# Patient Record
Sex: FEMALE | Race: WHITE | Employment: FULL TIME | URBAN - METROPOLITAN AREA
[De-identification: names, ages, dates, MRNs, and addresses within clinical notes are randomized per-mention and may not be internally consistent; named-entity substitution may affect disease eponyms.]

---

## 2017-04-14 PROBLEM — K21.9 GERD WITHOUT ESOPHAGITIS: Status: ACTIVE | Noted: 2017-04-14

## 2017-04-14 PROCEDURE — 88175 CYTOPATH C/V AUTO FLUID REDO: CPT | Performed by: FAMILY MEDICINE

## 2018-08-22 PROBLEM — F41.1 GAD (GENERALIZED ANXIETY DISORDER): Status: ACTIVE | Noted: 2018-08-22

## 2018-08-22 PROBLEM — J30.1 ALLERGIC RHINITIS DUE TO POLLEN, UNSPECIFIED SEASONALITY: Status: ACTIVE | Noted: 2018-08-22

## 2023-05-16 ENCOUNTER — TELEPHONE (OUTPATIENT)
Dept: OBGYN CLINIC | Facility: CLINIC | Age: 33
End: 2023-05-16

## 2023-05-16 ENCOUNTER — LAB ENCOUNTER (OUTPATIENT)
Dept: LAB | Facility: HOSPITAL | Age: 33
End: 2023-05-16
Attending: ADVANCED PRACTICE MIDWIFE
Payer: COMMERCIAL

## 2023-05-16 ENCOUNTER — OFFICE VISIT (OUTPATIENT)
Dept: OBGYN CLINIC | Facility: CLINIC | Age: 33
End: 2023-05-16

## 2023-05-16 VITALS
DIASTOLIC BLOOD PRESSURE: 76 MMHG | HEIGHT: 62 IN | BODY MASS INDEX: 24.48 KG/M2 | SYSTOLIC BLOOD PRESSURE: 122 MMHG | HEART RATE: 80 BPM | WEIGHT: 133 LBS

## 2023-05-16 DIAGNOSIS — O20.9 VAGINAL BLEEDING AFFECTING EARLY PREGNANCY: Primary | ICD-10-CM

## 2023-05-16 DIAGNOSIS — O20.9 BLEEDING IN EARLY PREGNANCY: ICD-10-CM

## 2023-05-16 DIAGNOSIS — Z11.3 SCREEN FOR STD (SEXUALLY TRANSMITTED DISEASE): ICD-10-CM

## 2023-05-16 DIAGNOSIS — O20.9 BLEEDING IN EARLY PREGNANCY: Primary | ICD-10-CM

## 2023-05-16 DIAGNOSIS — N92.6 MISSED MENSES: ICD-10-CM

## 2023-05-16 LAB
ANTIBODY SCREEN: NEGATIVE
B-HCG SERPL-ACNC: 211 MIU/ML
CONTROL LINE PRESENT WITH A CLEAR BACKGROUND (YES/NO): YES YES/NO
PREGNANCY TEST, URINE: POSITIVE
PROGEST SERPL-MCNC: 2.28 NG/ML
RH BLOOD TYPE: POSITIVE

## 2023-05-16 PROCEDURE — 36415 COLL VENOUS BLD VENIPUNCTURE: CPT

## 2023-05-16 PROCEDURE — 3008F BODY MASS INDEX DOCD: CPT | Performed by: ADVANCED PRACTICE MIDWIFE

## 2023-05-16 PROCEDURE — 86850 RBC ANTIBODY SCREEN: CPT

## 2023-05-16 PROCEDURE — 99203 OFFICE O/P NEW LOW 30 MIN: CPT | Performed by: ADVANCED PRACTICE MIDWIFE

## 2023-05-16 PROCEDURE — 3078F DIAST BP <80 MM HG: CPT | Performed by: ADVANCED PRACTICE MIDWIFE

## 2023-05-16 PROCEDURE — 84144 ASSAY OF PROGESTERONE: CPT

## 2023-05-16 PROCEDURE — 81025 URINE PREGNANCY TEST: CPT | Performed by: ADVANCED PRACTICE MIDWIFE

## 2023-05-16 PROCEDURE — 86900 BLOOD TYPING SEROLOGIC ABO: CPT

## 2023-05-16 PROCEDURE — 3074F SYST BP LT 130 MM HG: CPT | Performed by: ADVANCED PRACTICE MIDWIFE

## 2023-05-16 PROCEDURE — 84702 CHORIONIC GONADOTROPIN TEST: CPT

## 2023-05-16 PROCEDURE — 86901 BLOOD TYPING SEROLOGIC RH(D): CPT

## 2023-05-16 RX ORDER — ESCITALOPRAM OXALATE 20 MG/1
10 TABLET ORAL DAILY
Qty: 15 TABLET | Refills: 5 | COMMUNITY
Start: 2023-04-17 | End: 2023-10-14

## 2023-05-16 RX ORDER — CHOLECALCIFEROL (VITAMIN D3) 25 MCG
1 TABLET,CHEWABLE ORAL DAILY
COMMUNITY

## 2023-05-17 LAB
C TRACH DNA SPEC QL NAA+PROBE: NEGATIVE
N GONORRHOEA DNA SPEC QL NAA+PROBE: NEGATIVE

## 2023-05-18 ENCOUNTER — LAB ENCOUNTER (OUTPATIENT)
Dept: LAB | Facility: HOSPITAL | Age: 33
End: 2023-05-18
Attending: ADVANCED PRACTICE MIDWIFE
Payer: COMMERCIAL

## 2023-05-18 DIAGNOSIS — O20.9 VAGINAL BLEEDING AFFECTING EARLY PREGNANCY: ICD-10-CM

## 2023-05-18 LAB
B-HCG SERPL-ACNC: 55 MIU/ML
GENITAL VAGINOSIS SCREEN: NEGATIVE
PROGEST SERPL-MCNC: 1.11 NG/ML
TRICHOMONAS SCREEN: NEGATIVE

## 2023-05-18 PROCEDURE — 84702 CHORIONIC GONADOTROPIN TEST: CPT

## 2023-05-18 PROCEDURE — 84144 ASSAY OF PROGESTERONE: CPT | Performed by: ADVANCED PRACTICE MIDWIFE

## 2023-05-18 PROCEDURE — 36415 COLL VENOUS BLD VENIPUNCTURE: CPT

## 2023-05-25 ENCOUNTER — TELEPHONE (OUTPATIENT)
Dept: OBGYN CLINIC | Facility: CLINIC | Age: 33
End: 2023-05-25

## 2023-05-25 DIAGNOSIS — O03.9 MISCARRIAGE: Primary | ICD-10-CM

## 2023-05-25 NOTE — TELEPHONE ENCOUNTER
Patient name and  verified. Patient informed of CNM result note and recommendations. Lab locations and hours sent to patient for weekly lab completion. Verbalized understanding.

## 2023-05-25 NOTE — TELEPHONE ENCOUNTER
----- Message from Trevor Hubbard CNM sent at 5/24/2023  3:35 PM CDT -----  Pt should repeat this week to follow to zero  Should also schedule a follow up appt

## 2023-05-30 ENCOUNTER — LAB ENCOUNTER (OUTPATIENT)
Dept: LAB | Age: 33
End: 2023-05-30
Attending: ADVANCED PRACTICE MIDWIFE
Payer: COMMERCIAL

## 2023-05-30 DIAGNOSIS — O03.9 MISCARRIAGE: ICD-10-CM

## 2023-05-30 LAB — B-HCG SERPL-ACNC: 2 MIU/ML

## 2023-05-30 PROCEDURE — 84702 CHORIONIC GONADOTROPIN TEST: CPT

## 2023-05-30 PROCEDURE — 36415 COLL VENOUS BLD VENIPUNCTURE: CPT

## 2023-06-01 ENCOUNTER — TELEPHONE (OUTPATIENT)
Dept: OBGYN CLINIC | Facility: CLINIC | Age: 33
End: 2023-06-01

## 2023-06-01 NOTE — TELEPHONE ENCOUNTER
----- Message from Raoul Monzon CNM sent at 5/31/2023  9:22 AM CDT -----  HCG back to normal  Pt should return to menses in the next couple of months  If desires can attempt pregnancy  If does bnot desire pregnancy at this time should have follow up visit for contraception Folic acid daily

## 2023-09-19 ENCOUNTER — OFFICE VISIT (OUTPATIENT)
Dept: OBGYN CLINIC | Facility: CLINIC | Age: 33
End: 2023-09-19

## 2023-09-19 ENCOUNTER — LAB ENCOUNTER (OUTPATIENT)
Dept: LAB | Facility: REFERENCE LAB | Age: 33
End: 2023-09-19
Attending: OBSTETRICS & GYNECOLOGY
Payer: COMMERCIAL

## 2023-09-19 VITALS
SYSTOLIC BLOOD PRESSURE: 144 MMHG | HEART RATE: 76 BPM | WEIGHT: 132 LBS | BODY MASS INDEX: 24 KG/M2 | DIASTOLIC BLOOD PRESSURE: 87 MMHG

## 2023-09-19 DIAGNOSIS — N92.6 IRREGULAR PERIODS: ICD-10-CM

## 2023-09-19 DIAGNOSIS — N92.6 IRREGULAR PERIODS: Primary | ICD-10-CM

## 2023-09-19 LAB
T4 FREE SERPL-MCNC: 0.7 NG/DL (ref 0.8–1.7)
TSI SER-ACNC: 4.41 MIU/ML (ref 0.36–3.74)

## 2023-09-19 PROCEDURE — 84439 ASSAY OF FREE THYROXINE: CPT

## 2023-09-19 PROCEDURE — 84443 ASSAY THYROID STIM HORMONE: CPT

## 2023-09-19 PROCEDURE — 3079F DIAST BP 80-89 MM HG: CPT | Performed by: OBSTETRICS & GYNECOLOGY

## 2023-09-19 PROCEDURE — 99213 OFFICE O/P EST LOW 20 MIN: CPT | Performed by: OBSTETRICS & GYNECOLOGY

## 2023-09-19 PROCEDURE — 36415 COLL VENOUS BLD VENIPUNCTURE: CPT

## 2023-09-19 PROCEDURE — 3077F SYST BP >= 140 MM HG: CPT | Performed by: OBSTETRICS & GYNECOLOGY

## 2023-09-19 RX ORDER — ESCITALOPRAM OXALATE 10 MG/1
10 TABLET ORAL DAILY
COMMUNITY

## 2023-09-20 ENCOUNTER — TELEPHONE (OUTPATIENT)
Dept: OBGYN CLINIC | Facility: CLINIC | Age: 33
End: 2023-09-20

## 2023-09-20 NOTE — TELEPHONE ENCOUNTER
Pt was told to schedule a video visit f/u for test results, next opening for a virtual visit with JHONNY was 10/5, pt states that's too long of a wait.  Please advise

## 2023-09-20 NOTE — TELEPHONE ENCOUNTER
Lupe Clifton MD  9/20/2023  9:26 AM CDT Back to Top      Please schedule patient for a video visit tomorrow, 9/20/23 at 3:00, to discuss her results. Result noted     Scheduled tomorrow at 3 pm for video visit. Aware of scheduling info.

## 2023-09-21 ENCOUNTER — TELEMEDICINE (OUTPATIENT)
Dept: OBGYN CLINIC | Facility: CLINIC | Age: 33
End: 2023-09-21

## 2023-09-21 DIAGNOSIS — N92.6 IRREGULAR PERIODS: Primary | ICD-10-CM

## 2023-09-21 DIAGNOSIS — E03.9 HYPOTHYROIDISM, UNSPECIFIED TYPE: ICD-10-CM

## 2023-09-21 PROCEDURE — 99213 OFFICE O/P EST LOW 20 MIN: CPT | Performed by: OBSTETRICS & GYNECOLOGY

## 2023-09-24 RX ORDER — LEVOTHYROXINE SODIUM 0.03 MG/1
25 TABLET ORAL
Qty: 30 TABLET | Refills: 3 | Status: SHIPPED | OUTPATIENT
Start: 2023-09-24

## 2023-09-25 NOTE — PROGRESS NOTES
[facility] (formerly [facility]  Obstetrics and Gynecology  Video Visit  Focused Gynecology Problem Exam  Arielle Walker MD    Mundo  is a 34year old female presenting for follow up of labs. HPI:   Pt with elevated TSH and low T3. Medications (Active prior to today's visit):  Current Outpatient Medications   Medication Sig Dispense Refill    levothyroxine 25 MCG Oral Tab Take 1 tablet (25 mcg total) by mouth before breakfast. 30 tablet 3    escitalopram 10 MG Oral Tab Take 1 tablet (10 mg total) by mouth daily. escitalopram 20 MG Oral Tab Take 0.5 tablets (10 mg total) by mouth daily. 15 tablet 5    prenatal vitamin with DHA 27-0.8-228 MG Oral Cap Take 1 capsule by mouth daily. sertraline 100 MG Oral Tab Take 1 tablet (100 mg total) by mouth daily. 30 tablet 1     Allergies:    Dextromethorphan-Gu*    DIZZINESS  HISTORY:     OB History    Para Term  AB Living   1       1     SAB IAB Ectopic Multiple Live Births   1              # Outcome Date GA Lbr Gabriel/2nd Weight Sex Delivery Anes PTL Lv   1 SAB 2015     SAB   FD                                        Past Medical History:   Diagnosis Date    Esophageal reflux        No past surgical history on file.     Family History   Problem Relation Age of Onset    Hypertension Father     Lipids Father     High Cholesterol Father     Hypertension Mother     High Cholesterol Mother     Other (Gilbert's disease) Mother     Other (Gilbert's disease) Brother        Social History    Socioeconomic History      Marital status:       Spouse name: Not on file      Number of children: Not on file      Years of education: Not on file      Highest education level: Not on file    Occupational History      Not on file    Tobacco Use      Smoking status: Never      Smokeless tobacco: Never    Vaping Use      Vaping Use: Never used    Substance and Sexual Activity      Alcohol use: Yes        Comment: socially      Drug use: Never      Sexual activity: Not on file    Other Topics      Concerns:        Not on file    Social History Narrative      engaged, no kids, works in [address] Strain: Not on file  Food Insecurity: Not on file  Transportation Needs: Not on file  Physical Activity: Not on file  Stress: Not on file  Social Connections: Not on file  Housing Stability: Not on file    PHYSICAL EXAM:   Video Visit    GENERAL: well developed, well nourished, in no apparent distress     ASSESSMENT:    A: 35 yo  with hypothyroidism based on lab results.    (N92.6) Irregular periods  (primary encounter diagnosis)    (E03.9) Hypothyroidism, unspecified type  Plan: TSH W Reflex To Free T4      PLAN:   I reviewed lab results with patient. I discussed hypothyroidism. Pt stated she had previously been treated for hypothyroidism/thyroid disease. Levothyroxine started and pt to have repeat labs 4 weeks after starting medication.     ORDERS:   Orders Placed This Encounter      TSH W Reflex To Free T4    PRESCRIPTIONS:     Requested Prescriptions     Signed Prescriptions Disp Refills    levothyroxine 25 MCG Oral Tab 30 tablet 3     Sig: Take 1 tablet (25 mcg total) by mouth before breakfast.     IMAGING/ REFERRALS:    None     Nieves Parnell MD, MD  2023  11:05 PM

## 2023-10-06 ENCOUNTER — TELEPHONE (OUTPATIENT)
Dept: OBGYN CLINIC | Facility: CLINIC | Age: 33
End: 2023-10-06

## 2023-10-06 NOTE — TELEPHONE ENCOUNTER
Patient verified name and     Clarified with patient, discussed ultrasound is typically not included with MM appointment and is usually ordered with Radiology. Patient verbalized understanding and agreed.

## 2023-10-26 ENCOUNTER — OFFICE VISIT (OUTPATIENT)
Dept: OBGYN CLINIC | Facility: CLINIC | Age: 33
End: 2023-10-26

## 2023-10-26 VITALS
HEART RATE: 93 BPM | HEIGHT: 62 IN | SYSTOLIC BLOOD PRESSURE: 134 MMHG | BODY MASS INDEX: 24.29 KG/M2 | WEIGHT: 132 LBS | DIASTOLIC BLOOD PRESSURE: 78 MMHG

## 2023-10-26 DIAGNOSIS — Z32.01 PREGNANCY EXAMINATION OR TEST, POSITIVE RESULT: Primary | ICD-10-CM

## 2023-10-26 DIAGNOSIS — O36.80X0 ULTRASOUND SCAN TO CONFIRM FETAL VIABILITY WITH HISTORY OF MISCARRIAGE: ICD-10-CM

## 2023-10-26 DIAGNOSIS — Z87.59 ULTRASOUND SCAN TO CONFIRM FETAL VIABILITY WITH HISTORY OF MISCARRIAGE: ICD-10-CM

## 2023-10-26 DIAGNOSIS — N92.6 MISSED MENSES: ICD-10-CM

## 2023-10-26 LAB
CONTROL LINE PRESENT WITH A CLEAR BACKGROUND (YES/NO): YES YES/NO
KIT LOT #: NORMAL NUMERIC
PREGNANCY TEST, URINE: POSITIVE

## 2023-10-26 PROCEDURE — 3008F BODY MASS INDEX DOCD: CPT | Performed by: OBSTETRICS & GYNECOLOGY

## 2023-10-26 PROCEDURE — 76817 TRANSVAGINAL US OBSTETRIC: CPT | Performed by: OBSTETRICS & GYNECOLOGY

## 2023-10-26 PROCEDURE — 81025 URINE PREGNANCY TEST: CPT | Performed by: OBSTETRICS & GYNECOLOGY

## 2023-10-26 PROCEDURE — 3075F SYST BP GE 130 - 139MM HG: CPT | Performed by: OBSTETRICS & GYNECOLOGY

## 2023-10-26 PROCEDURE — 3078F DIAST BP <80 MM HG: CPT | Performed by: OBSTETRICS & GYNECOLOGY

## 2023-10-26 RX ORDER — TRAZODONE HYDROCHLORIDE 50 MG/1
TABLET ORAL
COMMUNITY
Start: 2023-08-15 | End: 2023-10-26

## 2023-11-13 ENCOUNTER — TELEPHONE (OUTPATIENT)
Dept: OBGYN CLINIC | Facility: CLINIC | Age: 33
End: 2023-11-13

## 2023-11-13 NOTE — TELEPHONE ENCOUNTER
Patient verified name and     Pt r/s Nurse Ed visit. Aware someone will call her this next appt. Patient verbalized understanding and agreed.

## 2023-11-13 NOTE — TELEPHONE ENCOUNTER
Pt never received call from [facility] for Eduation visit today 11/13. Pt just wants to make sure her next appt she will get a call.     Pls advise

## 2023-11-20 ENCOUNTER — NURSE ONLY (OUTPATIENT)
Dept: OBGYN CLINIC | Facility: CLINIC | Age: 33
End: 2023-11-20
Payer: COMMERCIAL

## 2023-11-20 DIAGNOSIS — Z34.91 ENCOUNTER FOR SUPERVISION OF NORMAL PREGNANCY IN FIRST TRIMESTER, UNSPECIFIED GRAVIDITY: Primary | ICD-10-CM

## 2023-11-20 NOTE — PROGRESS NOTES
Pt called today for RN [facility] Education. Missed menses apt with: JHONNY  LMP: 23  +UPT at home: 23  +UPT in office: 10/26/23    Pre  BMI: 23.41  ASA Therapy: initiated One 81 mg tablet per day    EPDS score: 0/30      US: 10/26  Working ADRIAN: 24  Hx of genetic abnormality in family: denies  Hx of varicella: had disease  Flu Vaccine: has not had this season     Consent (if needed): n/a    OUD Screening: Pt. Has answered NO 5P questions and has NO  risk factors. Pt. Given What pregnant women need to know handout. SDOH Screening: low risk      Educational material reviewed with patient: Prenatal care, nutrition, weight gain recommendations, travel, exercise, intercourse, pregnancy changes, safe medications, pregnancy and work, fetal movement, labor and  labor, warning signs, food safety, tdap, cord blood, breastfeeding, circumcision, and Group B strep. Plans to breast and bottle feed. Undecided regarding circ if male. Blood transfusion if needed: consents    PN labs: 1st trimester labs; TSH with reflex per JHONNY  Iron Supplementation: recommended Feosol or Slow FE every other day. Optional genetic screening labs were reviewed: James Pina, FTS with US, Quad screen MSAFP and CF screening. Pt declines at this time.     [facility] Media Policy: reviewed    NOB apt: 23 with JHONNY

## 2023-11-22 ENCOUNTER — LAB ENCOUNTER (OUTPATIENT)
Dept: LAB | Age: 33
End: 2023-11-22
Attending: OBSTETRICS & GYNECOLOGY
Payer: COMMERCIAL

## 2023-11-22 DIAGNOSIS — O03.9 MISCARRIAGE: ICD-10-CM

## 2023-11-22 DIAGNOSIS — Z34.91 ENCOUNTER FOR SUPERVISION OF NORMAL PREGNANCY IN FIRST TRIMESTER, UNSPECIFIED GRAVIDITY: ICD-10-CM

## 2023-11-22 DIAGNOSIS — E03.9 HYPOTHYROIDISM, UNSPECIFIED TYPE: ICD-10-CM

## 2023-11-22 LAB
ANTIBODY SCREEN: NEGATIVE
B-HCG SERPL-ACNC: ABNORMAL MIU/ML
BASOPHILS # BLD AUTO: 0.03 X10(3) UL (ref 0–0.2)
BASOPHILS NFR BLD AUTO: 0.3 %
BILIRUB UR QL: NEGATIVE
CLARITY UR: CLEAR
DEPRECATED HBV CORE AB SER IA-ACNC: 22.5 NG/ML
DEPRECATED RDW RBC AUTO: 39.8 FL (ref 35.1–46.3)
EOSINOPHIL # BLD AUTO: 0.07 X10(3) UL (ref 0–0.7)
EOSINOPHIL NFR BLD AUTO: 0.8 %
ERYTHROCYTE [DISTWIDTH] IN BLOOD BY AUTOMATED COUNT: 12.3 % (ref 11–15)
EST. AVERAGE GLUCOSE BLD GHB EST-MCNC: 97 MG/DL (ref 68–126)
GLUCOSE 1H P GLC SERPL-MCNC: 91 MG/DL
GLUCOSE UR-MCNC: NORMAL MG/DL
HBA1C MFR BLD: 5 % (ref ?–5.7)
HBV SURFACE AG SER-ACNC: <0.1 [IU]/L
HBV SURFACE AG SERPL QL IA: NONREACTIVE
HCT VFR BLD AUTO: 42.9 %
HCV AB SERPL QL IA: NONREACTIVE
HGB BLD-MCNC: 14.4 G/DL
HGB UR QL STRIP.AUTO: NEGATIVE
IMM GRANULOCYTES # BLD AUTO: 0.03 X10(3) UL (ref 0–1)
IMM GRANULOCYTES NFR BLD: 0.3 %
KETONES UR-MCNC: NEGATIVE MG/DL
LEUKOCYTE ESTERASE UR QL STRIP.AUTO: NEGATIVE
LYMPHOCYTES # BLD AUTO: 1.8 X10(3) UL (ref 1–4)
LYMPHOCYTES NFR BLD AUTO: 19.4 %
MCH RBC QN AUTO: 29.5 PG (ref 26–34)
MCHC RBC AUTO-ENTMCNC: 33.6 G/DL (ref 31–37)
MCV RBC AUTO: 87.9 FL
MONOCYTES # BLD AUTO: 0.41 X10(3) UL (ref 0.1–1)
MONOCYTES NFR BLD AUTO: 4.4 %
NEUTROPHILS # BLD AUTO: 6.95 X10 (3) UL (ref 1.5–7.7)
NEUTROPHILS # BLD AUTO: 6.95 X10(3) UL (ref 1.5–7.7)
NEUTROPHILS NFR BLD AUTO: 74.8 %
NITRITE UR QL STRIP.AUTO: NEGATIVE
PH UR: 6 [PH] (ref 5–8)
PLATELET # BLD AUTO: 235 10(3)UL (ref 150–450)
PROT UR-MCNC: NEGATIVE MG/DL
RBC # BLD AUTO: 4.88 X10(6)UL
RH BLOOD TYPE: POSITIVE
RUBV IGG SER QL: POSITIVE
RUBV IGG SER-ACNC: >500 IU/ML (ref 10–?)
SP GR UR STRIP: 1.02 (ref 1–1.03)
TSI SER-ACNC: 3.31 MIU/ML (ref 0.55–4.78)
UROBILINOGEN UR STRIP-ACNC: NORMAL
WBC # BLD AUTO: 9.3 X10(3) UL (ref 4–11)

## 2023-11-22 PROCEDURE — 36415 COLL VENOUS BLD VENIPUNCTURE: CPT

## 2023-11-22 PROCEDURE — 86780 TREPONEMA PALLIDUM: CPT

## 2023-11-22 PROCEDURE — 87389 HIV-1 AG W/HIV-1&-2 AB AG IA: CPT

## 2023-11-22 PROCEDURE — 85025 COMPLETE CBC W/AUTO DIFF WBC: CPT

## 2023-11-22 PROCEDURE — 87086 URINE CULTURE/COLONY COUNT: CPT

## 2023-11-22 PROCEDURE — 84702 CHORIONIC GONADOTROPIN TEST: CPT

## 2023-11-22 PROCEDURE — 81003 URINALYSIS AUTO W/O SCOPE: CPT

## 2023-11-22 PROCEDURE — 86850 RBC ANTIBODY SCREEN: CPT

## 2023-11-22 PROCEDURE — 86762 RUBELLA ANTIBODY: CPT

## 2023-11-22 PROCEDURE — 84443 ASSAY THYROID STIM HORMONE: CPT

## 2023-11-22 PROCEDURE — 86803 HEPATITIS C AB TEST: CPT

## 2023-11-22 PROCEDURE — 82728 ASSAY OF FERRITIN: CPT

## 2023-11-22 PROCEDURE — 86901 BLOOD TYPING SEROLOGIC RH(D): CPT

## 2023-11-22 PROCEDURE — 87340 HEPATITIS B SURFACE AG IA: CPT

## 2023-11-22 PROCEDURE — 82950 GLUCOSE TEST: CPT

## 2023-11-22 PROCEDURE — 86900 BLOOD TYPING SEROLOGIC ABO: CPT

## 2023-11-22 PROCEDURE — 83036 HEMOGLOBIN GLYCOSYLATED A1C: CPT

## 2023-11-23 PROBLEM — R79.0 LOW SERUM FERRITIN LEVEL: Status: ACTIVE | Noted: 2023-11-23

## 2023-11-24 LAB — T PALLIDUM AB SER QL: NEGATIVE

## 2023-11-27 ENCOUNTER — INITIAL PRENATAL (OUTPATIENT)
Dept: OBGYN CLINIC | Facility: CLINIC | Age: 33
End: 2023-11-27

## 2023-11-27 VITALS
HEART RATE: 87 BPM | BODY MASS INDEX: 24 KG/M2 | DIASTOLIC BLOOD PRESSURE: 82 MMHG | WEIGHT: 132 LBS | SYSTOLIC BLOOD PRESSURE: 133 MMHG

## 2023-11-27 DIAGNOSIS — Z34.82 ENCOUNTER FOR SUPERVISION OF OTHER NORMAL PREGNANCY IN SECOND TRIMESTER: Primary | ICD-10-CM

## 2023-11-27 DIAGNOSIS — E03.9 HYPOTHYROIDISM DURING PREGNANCY, ANTEPARTUM: ICD-10-CM

## 2023-11-27 DIAGNOSIS — O99.280 HYPOTHYROIDISM DURING PREGNANCY, ANTEPARTUM: ICD-10-CM

## 2023-11-27 PROCEDURE — 3075F SYST BP GE 130 - 139MM HG: CPT | Performed by: OBSTETRICS & GYNECOLOGY

## 2023-11-27 PROCEDURE — 90686 IIV4 VACC NO PRSV 0.5 ML IM: CPT | Performed by: OBSTETRICS & GYNECOLOGY

## 2023-11-27 PROCEDURE — 90471 IMMUNIZATION ADMIN: CPT | Performed by: OBSTETRICS & GYNECOLOGY

## 2023-11-27 PROCEDURE — 99213 OFFICE O/P EST LOW 20 MIN: CPT | Performed by: OBSTETRICS & GYNECOLOGY

## 2023-11-27 PROCEDURE — 3079F DIAST BP 80-89 MM HG: CPT | Performed by: OBSTETRICS & GYNECOLOGY

## 2023-11-28 LAB
C TRACH DNA SPEC QL NAA+PROBE: NEGATIVE
N GONORRHOEA DNA SPEC QL NAA+PROBE: NEGATIVE

## 2023-12-15 ENCOUNTER — HOSPITAL ENCOUNTER (OUTPATIENT)
Age: 33
Discharge: HOME | End: 2023-12-15
Payer: COMMERCIAL

## 2023-12-15 VITALS
RESPIRATION RATE: 16 BRPM | TEMPERATURE: 98 F | HEART RATE: 93 BPM | OXYGEN SATURATION: 99 % | DIASTOLIC BLOOD PRESSURE: 70 MMHG | SYSTOLIC BLOOD PRESSURE: 125 MMHG

## 2023-12-15 DIAGNOSIS — U07.1 COVID-19: Primary | ICD-10-CM

## 2023-12-15 DIAGNOSIS — Z20.822 ENCOUNTER FOR LABORATORY TESTING FOR COVID-19 VIRUS: ICD-10-CM

## 2023-12-15 LAB
S PYO AG THROAT QL: NEGATIVE
SARS-COV-2 RNA RESP QL NAA+PROBE: DETECTED

## 2023-12-15 NOTE — DISCHARGE INSTRUCTIONS
COVID test was positive. You should quarantine for 5 days. Regardless of your symptoms, you should wear a mask in public for 5 days. Go to the emergency department if you develop any chest pain, shortness of breath, fever, vomiting, diarrhea or any other concerning complaints. Use Flonase for nasal congestion. Any other medication recommendations should come from your obstetrician. You can also take tylenol for fever or pain. Increase PO fluid intake. Follow up with PCP in 2-3 days.

## 2024-01-04 ENCOUNTER — ROUTINE PRENATAL (OUTPATIENT)
Dept: OBGYN CLINIC | Facility: CLINIC | Age: 34
End: 2024-01-04
Payer: COMMERCIAL

## 2024-01-04 VITALS
DIASTOLIC BLOOD PRESSURE: 82 MMHG | BODY MASS INDEX: 25 KG/M2 | WEIGHT: 139 LBS | HEART RATE: 86 BPM | SYSTOLIC BLOOD PRESSURE: 122 MMHG

## 2024-01-04 DIAGNOSIS — E03.9 HYPOTHYROIDISM, UNSPECIFIED TYPE: Primary | ICD-10-CM

## 2024-01-04 PROCEDURE — 3074F SYST BP LT 130 MM HG: CPT | Performed by: OBSTETRICS & GYNECOLOGY

## 2024-01-04 PROCEDURE — 3079F DIAST BP 80-89 MM HG: CPT | Performed by: OBSTETRICS & GYNECOLOGY

## 2024-01-04 RX ORDER — ASPIRIN 81 MG/1
81 TABLET ORAL DAILY
COMMUNITY

## 2024-01-24 NOTE — PROGRESS NOTES
Outpatient Maternal-Fetal Medicine Consultation    Dear Dr. Mercer,    Thank you for requesting ultrasound evaluation and maternal fetal medicine consultation on your patient Apolonia Haley.  As you are aware she is a 33 year old female with a Ayoub pregnancy at 21w2d.  A maternal-fetal medicine consultation was requested secondary to hypothyroidism in pregnancy.  Her prenatal records and labs were reviewed.    HISTORY  OB History    Para Term  AB Living   2 0 0 0 1 0   SAB IAB Ectopic Multiple Live Births   1 0 0 0 0     # 1 - Date: 2015, Sex: None, Weight: None, GA: None, Delivery: Spontaneous , Apgar1: None, Apgar5: None, Living: Fetal Demise, Birth Comments: None    # 2 - Date: None, Sex: None, Weight: None, GA: None, Delivery: None, Apgar1: None, Apgar5: None, Living: None, Birth Comments: None    Past Medical History  The patient  has a past medical history of Esophageal reflux and Thyroid disease.    Past Surgical History  The patient  has a past surgical history that includes wisdom teeth removed ().    Family History  The patient She indicated that her mother is alive. She indicated that her father is alive. She indicated that her brother is alive. She indicated that the status of her maternal grandmother is unknown. She indicated that the status of her maternal grandfather is unknown. She indicated that the status of her paternal grandmother is unknown.      Medications:   Current Outpatient Medications:     aspirin 81 MG Oral Tab EC, Take 1 tablet (81 mg total) by mouth daily., Disp: , Rfl:     Ferrous Sulfate 325 (65 Fe) MG Oral Tab, Take 1 tablet (325 mg total) by mouth every other day., Disp: 45 tablet, Rfl: 1    levothyroxine 25 MCG Oral Tab, Take 1 tablet (25 mcg total) by mouth before breakfast., Disp: 30 tablet, Rfl: 3    escitalopram 10 MG Oral Tab, Take 1 tablet (10 mg total) by mouth daily., Disp: , Rfl:     prenatal vitamin with DHA 27-0.8-228 MG Oral Cap,  Take 1 capsule by mouth daily., Disp: , Rfl:   Allergies:   Allergies   Allergen Reactions    Dextromethorphan-Guaifenesin DIZZINESS         PHYSICAL EXAMINATION:  /84   Pulse 98   Wt 141 lb (64 kg)   LMP 2023 (Approximate)   BMI 25.79 kg/m²   General: alert and oriented,no acute distress  Abdomen: gravid, soft, non-tender  Extremities: non-tender, no edema      OBSTETRIC ULTRASOUND  The patient had a level II ultrasound today which I interpreted the results and reviewed them with the patient.    Ultrasound Findings:  Single IUP in variable presentation.    Placenta is anterior, high.   A 3 vessel cord is noted.  Cardiac activity is present at 137 bpm   g ( 1 lb 0 oz);    MVP is 4.6 cm .     The fetal measurements are consistent with the established EDC. No ultrasound evidence of structural abnormalities are seen today. The nasal bone is present. No ultrasound evidence of markers for aneuploidy are seen. She understands that ultrasound exam cannot exclude genetic abnormalities and that genetic testing is recommended. The limitations of ultrasound were discussed.    See imaging tab for the complete US report.    DISCUSSION  During her visit we discussed and reviewed the following issues:  Hypothyroidism has been associated with an increased risk of several complications, including:  ·Preeclampsia and gestational hypertension  ·Placental abruption  ·Nonreassuring fetal heart rate tracing  · delivery, including very  delivery (before 32 weeks)  ·Low birth weight  ·Increased rate of  section  · morbidity and mortality  ·Neuropsychological and cognitive impairment  ·Postpartum hemorrhage    Overt hypothyroidism (elevated TSH, reduced free T4) complicating pregnancy is unusual. Two factors contribute to this finding: some hypothyroid women are anovulatory, and hypothyroidism (new or inadequately treated) complicating pregnancy is associated with an increased rate of  first trimester spontaneous .  The risk of complications during pregnancy is lower in women with subclinical, rather than overt hypothyroidism. However, in some studies, women with subclinical hypothyroidism were also reported to be at increased risk for severe preeclampsia,  delivery, placental abruption, and/or pregnancy loss.  Isolated maternal hypothyroxinemia (low T4) is defined as a maternal free T4 concentration in the lower 5th or 10th percentile of the reference range, in conjunction with a normal TSH. The effect of isolated maternal hypothyroxinemia on  and  outcome is unclear.     All pregnant women with newly diagnosed overt hypothyroidism (thyroid-stimulating hormone [TSH] above trimester-specific normal reference range with low free thyroxine [T4]) should be treated with thyroid hormone (T4). In addition, because maternal euthyroidism is potentially important for normal fetal cognitive development, it is suggested to treat pregnant women with subclinical hypothyroidism. Because of the changes in thyroid physiology during normal pregnancy, thyroid function tests should be interpreted using trimester-specific TSH and T4 reference ranges for pregnant women. The upper limit of normal for TSH in the first trimester of pregnancy is approximately 2.5 mU/L (3.0 mU/L in the second and third trimesters) rather than 4.5 to 5.0 mU/L used by most laboratories. Total T4 and T3 levels during pregnancy are 1.5-fold higher than in nonpregnant women. We do not suggest the treatment of pregnant women with isolated hypothyroxinemia (low free T4, normal TSH).    Thyroid function should be monitored throughout the pregnancy by assessing TSH and FT4 or FT3 about every 8-12 weeks and more frequently if the clinical situation dictates. Adjust the dose appropriately with the goal of maintaining the FT3/FT4 in the upper levels of the normal range and avoid over treatment. Perform ultrasound in the  third trimester and as needed to assess growth and potential goiter.     Thyroid:    Lab Results   Component Value Date    TSH 3.314 2023    TSH 4.410 (H) 2023    TSH 3.930 2021    T4F 0.7 (L) 2023     Her levothyroxine dose is 25 mcg daily.  She has been on this dose since just prior to pregnancy.  We discussed repeating the TSH and increasing her dose if her TSH is not 2.5 or less.    H/o Anxiety -   She is taking escitalopram 10 mg every day for anxiety.  Her symptoms are well controlled.           We discussed what is known about the safety of selective serotonin receptor inhibitors (SSRI) in pregnancy.    An increased risk of teratogenic effects, including cardiovascular defects, may be associated with maternal use of sertraline or other SSRIs.   Nonteratogenic effects that may been seen in the  period include respiratory distress, cyanosis, apnea, siezures, temperature instability, feeding difficulty, vomiting, hypoglycemia, increased or decreased tone, irritability, crying, hyper-reflexia, and jitteriness or tremor.   Exposure to SSRIs late in pregnancy has also been associated with persistent pulmonary hypertension of the .   Adverse effects may be due to toxic effects of the SSRI or drug withdrawal due to discontinuation. The long-term effects of in utero SSRI exposure on infant development and behavior are not known.           Due to pregnancy-induced physiologic changes, women who are pregnant may require increased doses of paroxetine to achieve euthymia. Women treated for major depression and who are euthymic prior to pregnancy are more likely to experience a relapse when medication is discontinued as compared to pregnant women who continue taking antidepressant medications. The ACOG recommends that therapy with SSRIs or SNRIs during pregnancy be individualized; treatment of depression during pregnancy should incorporate the clinical expertise of the mental health  clinician, obstetrician, primary healthcare provider, and pediatrician (ACOG, 2007). The ACOG also recommend that therapy with paroxetine(Paxil) be avoided during pregnancy if possible and that fetuses exposed in early pregnancy be assessed with a fetal echocardiography.             If treatment during pregnancy is required, tapering therapy is not advised during the third trimester despite the potential for withdrawal symptoms in the infant. Other evidence supports that inadequately treated maternal mood disorders has other effects on  behavior that can be related to impaired mother-infant bonding from inadequately treated depression/anxiety. Although this outcome is more difficult to measure, the repercussions can be significant. In addition, even with immediate use of SSRI's after delviery, given their prolonged half-life, clinical effectiveness can take several weeks. This exposes the patient to potentially significant risks of exacerbation of her mood disorder at a particularly vulnerable time. This further emphasizes the need for an individualized approach to care.     We discussed the recommended plan of care based on her  risk factors.  Apolonia and her significant other, Raimundo, had their questions answered to their satisfaction.      IMPRESSION:  IUP at 21w2d  Normal level II Ultrasound  Hypothyroidism, mildly increased TSH for pregnancy  SSRI use    RECOMMENDATIONS:  Continue care with Dr. Cantu  Growth and BPP at 32 weeks  Monitor TSH now then every 4 weeks until her dose is stable, then reduce to every 8-12 weeks  Monitor for exacerbation of her mood disorder    Total time spent 40 minutes this calendar day which includes preparing to see the patient including chart review, obtaining and/or reviewing additional medical history, performing a physical exam and evaluation, documenting clinical information in the electronic medical record, independently interpreting results, counseling  the patient, communicating results to the patient/family/caregiver and coordinating care.     Case discussed with patient who demonstrated understanding and agreement with plan.     Thank you for allowing me to participate in the care of this patient.  Please feel free to contact me with any questions.    Melissa Vega MD  Maternal-Fetal Medicine       Note to patient and family:  The 21st Century Cures Act makes medical notes available to patients in the interest of transparency.  However, please be advised that this is a medical document.  It is intended as a peer to peer communication.  It is written in medical language and may contain abbreviations or verbiage that are technical and unfamiliar.  It may appear blunt or direct.  Medical documents are intended to carry relevant information, facts as evident, and the clinical opinion of the practitioner.

## 2024-01-25 ENCOUNTER — HOSPITAL ENCOUNTER (OUTPATIENT)
Dept: PERINATAL CARE | Facility: HOSPITAL | Age: 34
Discharge: HOME | End: 2024-01-25
Attending: OBSTETRICS & GYNECOLOGY
Payer: COMMERCIAL

## 2024-01-25 VITALS
HEART RATE: 98 BPM | WEIGHT: 141 LBS | DIASTOLIC BLOOD PRESSURE: 84 MMHG | BODY MASS INDEX: 26 KG/M2 | SYSTOLIC BLOOD PRESSURE: 126 MMHG

## 2024-01-25 DIAGNOSIS — E03.9 HYPOTHYROIDISM DURING PREGNANCY IN SECOND TRIMESTER: ICD-10-CM

## 2024-01-25 DIAGNOSIS — Z36.89 ENCOUNTER FOR FETAL ANATOMIC SURVEY: ICD-10-CM

## 2024-01-25 DIAGNOSIS — O09.891 MEDICATION EXPOSURE DURING FIRST TRIMESTER OF PREGNANCY: ICD-10-CM

## 2024-01-25 DIAGNOSIS — E03.9 HYPOTHYROIDISM DURING PREGNANCY IN SECOND TRIMESTER: Primary | ICD-10-CM

## 2024-01-25 DIAGNOSIS — F41.1 GAD (GENERALIZED ANXIETY DISORDER): ICD-10-CM

## 2024-01-25 DIAGNOSIS — O99.282 HYPOTHYROIDISM DURING PREGNANCY IN SECOND TRIMESTER: Primary | ICD-10-CM

## 2024-01-25 DIAGNOSIS — O99.282 HYPOTHYROIDISM DURING PREGNANCY IN SECOND TRIMESTER: ICD-10-CM

## 2024-01-25 PROCEDURE — 76811 OB US DETAILED SNGL FETUS: CPT | Performed by: OBSTETRICS & GYNECOLOGY

## 2024-01-27 RX ORDER — LEVOTHYROXINE SODIUM 0.03 MG/1
25 TABLET ORAL
Qty: 30 TABLET | Refills: 3 | Status: SHIPPED | OUTPATIENT
Start: 2024-01-27

## 2024-01-29 ENCOUNTER — ROUTINE PRENATAL (OUTPATIENT)
Dept: OBGYN CLINIC | Facility: CLINIC | Age: 34
End: 2024-01-29
Payer: COMMERCIAL

## 2024-01-29 VITALS — DIASTOLIC BLOOD PRESSURE: 76 MMHG | SYSTOLIC BLOOD PRESSURE: 117 MMHG | BODY MASS INDEX: 26 KG/M2 | WEIGHT: 142 LBS

## 2024-01-29 DIAGNOSIS — Z34.82 ENCOUNTER FOR SUPERVISION OF OTHER NORMAL PREGNANCY IN SECOND TRIMESTER: Primary | ICD-10-CM

## 2024-01-29 DIAGNOSIS — E03.9 HYPOTHYROIDISM, UNSPECIFIED TYPE: ICD-10-CM

## 2024-01-29 LAB
BILIRUBIN: NEGATIVE
GLUCOSE (URINE DIPSTICK): NEGATIVE MG/DL
KETONES (URINE DIPSTICK): NEGATIVE MG/DL
MULTISTIX LOT#: ABNORMAL NUMERIC
NITRITE, URINE: NEGATIVE
OCCULT BLOOD: NEGATIVE
PH, URINE: 7 (ref 4.5–8)
PROTEIN (URINE DIPSTICK): NEGATIVE MG/DL
SPECIFIC GRAVITY: 1.01 (ref 1–1.03)
URINE-COLOR: YELLOW
UROBILINOGEN,SEMI-QN: 0.2 MG/DL (ref 0–1.9)

## 2024-01-29 PROCEDURE — 81002 URINALYSIS NONAUTO W/O SCOPE: CPT | Performed by: OBSTETRICS & GYNECOLOGY

## 2024-01-29 PROCEDURE — 3074F SYST BP LT 130 MM HG: CPT | Performed by: OBSTETRICS & GYNECOLOGY

## 2024-01-29 PROCEDURE — 3078F DIAST BP <80 MM HG: CPT | Performed by: OBSTETRICS & GYNECOLOGY

## 2024-01-29 NOTE — PROGRESS NOTES
Pt happy and feels well. To go to lab for tsh and standing order for monthly tsh ordered,.  No c/o. Good fm.

## 2024-02-12 ENCOUNTER — LAB ENCOUNTER (OUTPATIENT)
Dept: LAB | Age: 34
End: 2024-02-12
Attending: OBSTETRICS & GYNECOLOGY
Payer: COMMERCIAL

## 2024-02-12 DIAGNOSIS — E03.9 HYPOTHYROIDISM, UNSPECIFIED TYPE: ICD-10-CM

## 2024-02-12 LAB — TSI SER-ACNC: 2.23 MIU/ML (ref 0.55–4.78)

## 2024-02-12 PROCEDURE — 84443 ASSAY THYROID STIM HORMONE: CPT

## 2024-02-12 PROCEDURE — 36415 COLL VENOUS BLD VENIPUNCTURE: CPT

## 2024-02-26 ENCOUNTER — ROUTINE PRENATAL (OUTPATIENT)
Dept: OBGYN CLINIC | Facility: CLINIC | Age: 34
End: 2024-02-26
Payer: COMMERCIAL

## 2024-02-26 VITALS — SYSTOLIC BLOOD PRESSURE: 126 MMHG | DIASTOLIC BLOOD PRESSURE: 80 MMHG | BODY MASS INDEX: 27 KG/M2 | WEIGHT: 149 LBS

## 2024-02-26 DIAGNOSIS — Z34.82 ENCOUNTER FOR SUPERVISION OF OTHER NORMAL PREGNANCY IN SECOND TRIMESTER (HCC): Primary | ICD-10-CM

## 2024-02-26 LAB
BILIRUBIN: NEGATIVE
GLUCOSE (URINE DIPSTICK): NEGATIVE MG/DL
KETONES (URINE DIPSTICK): NEGATIVE MG/DL
MULTISTIX LOT#: ABNORMAL NUMERIC
NITRITE, URINE: NEGATIVE
OCCULT BLOOD: NEGATIVE
PH, URINE: 6.5 (ref 4.5–8)
PROTEIN (URINE DIPSTICK): NEGATIVE MG/DL
SPECIFIC GRAVITY: 1.01 (ref 1–1.03)
URINE-COLOR: YELLOW
UROBILINOGEN,SEMI-QN: 0.2 MG/DL (ref 0–1.9)

## 2024-02-26 PROCEDURE — 81002 URINALYSIS NONAUTO W/O SCOPE: CPT | Performed by: OBSTETRICS & GYNECOLOGY

## 2024-03-14 ENCOUNTER — LAB ENCOUNTER (OUTPATIENT)
Dept: LAB | Age: 34
End: 2024-03-14
Attending: OBSTETRICS & GYNECOLOGY
Payer: COMMERCIAL

## 2024-03-14 DIAGNOSIS — E03.9 HYPOTHYROIDISM, UNSPECIFIED TYPE: ICD-10-CM

## 2024-03-14 DIAGNOSIS — Z34.82 ENCOUNTER FOR SUPERVISION OF OTHER NORMAL PREGNANCY IN SECOND TRIMESTER (HCC): ICD-10-CM

## 2024-03-14 LAB
BASOPHILS # BLD AUTO: 0.03 X10(3) UL (ref 0–0.2)
BASOPHILS NFR BLD AUTO: 0.4 %
DEPRECATED HBV CORE AB SER IA-ACNC: 14.1 NG/ML
DEPRECATED RDW RBC AUTO: 42.6 FL (ref 35.1–46.3)
EOSINOPHIL # BLD AUTO: 0.12 X10(3) UL (ref 0–0.7)
EOSINOPHIL NFR BLD AUTO: 1.4 %
ERYTHROCYTE [DISTWIDTH] IN BLOOD BY AUTOMATED COUNT: 13.1 % (ref 11–15)
GLUCOSE 1H P GLC SERPL-MCNC: 101 MG/DL
HCT VFR BLD AUTO: 37.5 %
HGB BLD-MCNC: 12.9 G/DL
IMM GRANULOCYTES # BLD AUTO: 0.09 X10(3) UL (ref 0–1)
IMM GRANULOCYTES NFR BLD: 1.1 %
LYMPHOCYTES # BLD AUTO: 1.04 X10(3) UL (ref 1–4)
LYMPHOCYTES NFR BLD AUTO: 12.3 %
MCH RBC QN AUTO: 30.7 PG (ref 26–34)
MCHC RBC AUTO-ENTMCNC: 34.4 G/DL (ref 31–37)
MCV RBC AUTO: 89.3 FL
MONOCYTES # BLD AUTO: 0.63 X10(3) UL (ref 0.1–1)
MONOCYTES NFR BLD AUTO: 7.4 %
NEUTROPHILS # BLD AUTO: 6.55 X10 (3) UL (ref 1.5–7.7)
NEUTROPHILS # BLD AUTO: 6.55 X10(3) UL (ref 1.5–7.7)
NEUTROPHILS NFR BLD AUTO: 77.4 %
PLATELET # BLD AUTO: 212 10(3)UL (ref 150–450)
RBC # BLD AUTO: 4.2 X10(6)UL
T PALLIDUM AB SER QL IA: NONREACTIVE
TSI SER-ACNC: 1.39 MIU/ML (ref 0.55–4.78)
WBC # BLD AUTO: 8.5 X10(3) UL (ref 4–11)

## 2024-03-14 PROCEDURE — 82728 ASSAY OF FERRITIN: CPT

## 2024-03-14 PROCEDURE — 82950 GLUCOSE TEST: CPT

## 2024-03-14 PROCEDURE — 36415 COLL VENOUS BLD VENIPUNCTURE: CPT

## 2024-03-14 PROCEDURE — 87389 HIV-1 AG W/HIV-1&-2 AB AG IA: CPT

## 2024-03-14 PROCEDURE — 85025 COMPLETE CBC W/AUTO DIFF WBC: CPT

## 2024-03-14 PROCEDURE — 86780 TREPONEMA PALLIDUM: CPT

## 2024-03-14 PROCEDURE — 84443 ASSAY THYROID STIM HORMONE: CPT

## 2024-03-18 ENCOUNTER — ROUTINE PRENATAL (OUTPATIENT)
Dept: OBGYN CLINIC | Facility: CLINIC | Age: 34
End: 2024-03-18
Payer: COMMERCIAL

## 2024-03-18 VITALS — WEIGHT: 150 LBS | DIASTOLIC BLOOD PRESSURE: 76 MMHG | BODY MASS INDEX: 27 KG/M2 | SYSTOLIC BLOOD PRESSURE: 127 MMHG

## 2024-03-18 DIAGNOSIS — Z34.83 ENCOUNTER FOR SUPERVISION OF OTHER NORMAL PREGNANCY IN THIRD TRIMESTER (HCC): Primary | ICD-10-CM

## 2024-03-18 LAB
BILIRUBIN: NEGATIVE
GLUCOSE (URINE DIPSTICK): NEGATIVE MG/DL
KETONES (URINE DIPSTICK): NEGATIVE MG/DL
LEUKOCYTES: NEGATIVE
MULTISTIX LOT#: ABNORMAL NUMERIC
NITRITE, URINE: NEGATIVE
OCCULT BLOOD: NEGATIVE
PH, URINE: 6.5 (ref 4.5–8)
PROTEIN (URINE DIPSTICK): NEGATIVE MG/DL
SPECIFIC GRAVITY: 1.01 (ref 1–1.03)
URINE-COLOR: YELLOW
UROBILINOGEN,SEMI-QN: 0.2 MG/DL (ref 0–1.9)

## 2024-03-18 PROCEDURE — 81002 URINALYSIS NONAUTO W/O SCOPE: CPT | Performed by: STUDENT IN AN ORGANIZED HEALTH CARE EDUCATION/TRAINING PROGRAM

## 2024-03-18 NOTE — PROGRESS NOTES
[facility]  Obstetrics and Gynecology  Prenatal Visit  Amy Butterfield PA-C    HPI   Apolonia Haley is a 34 year old.o.  28w6d weeks.    Pt is here for routine prenatal visit. No complaints or concerns.   Denies any regular uterine contractions, spontaneous rupture membranes or vaginal bleeding.  Patient feeling normal fetal movement.    OB History     OB History    Para Term  AB Living   2       1     SAB IAB Ectopic Multiple Live Births   1              # Outcome Date GA Lbr Gabriel/2nd Weight Sex Delivery Anes PTL Lv   2 Current            1 SAB 2015     SAB   FD     Medications     Current Outpatient Medications   Medication Sig Dispense Refill    levothyroxine 25 MCG Oral Tab Take 1 tablet (25 mcg total) by mouth before breakfast. 30 tablet 3    Ferrous Sulfate 325 (65 Fe) MG Oral Tab Take 1 tablet (325 mg total) by mouth every other day. 45 tablet 1    escitalopram 10 MG Oral Tab Take 1 tablet (10 mg total) by mouth daily.      prenatal vitamin with DHA 27-0.8-228 MG Oral Cap Take 1 capsule by mouth daily.      aspirin 81 MG Oral Tab EC Take 1 tablet (81 mg total) by mouth daily.       Exam   /76   Wt 150 lb (68 kg)   LMP 2023 (Approximate)   BMI 27.44 kg/m²   FH: 28  FHTs: 140  Assessment   Apolonia is a 34 year old female  with viable IUP at 28w6d weeks.      ICD-10-CM    1. Encounter for supervision of other normal pregnancy in third trimester ([facility])  Z34.83 POC Urinalysis, Manual Dip without microscopy [[id number]]        Plan   - Pt counseled on fetal kick counts.  Recommend daily that patient have something to eat or drink and concentrate on the baby for an hour.  If she does not experience 5-6 movements or if she has any subjective decrease in movement she should go to L&D for an NST or contact the office if she has questions.  -Labor precautions discussed with patient. If she has strong contractions that last about a minute and occur every 5-6 minutes  for 60-90 minutes, if her water breaks which causes significant leakage or gush of fluid, or is she has any major bleeding she should go to the hospital/L&D. Pt should call office to speak with on-call provider or nurse if she has any questions about whether or not she should go to hospital/L&D.   -Recommended tdap vaccine in pregnancy which patient can get starting at 27 weeks until 36 weeks.  Discussed risks of pertussis/\"whooping cough\", in newborns.  Discussed benefits of preventing pertussis in those around the  such as parents, grandparents, caregivers, household contacts, other children etc.  Discussed side effects and rare risks of tdap vaccine including redness, pain, fatigue, body aches and fever.  Pt understands and receive at next visit  - Pt already completed labs  - RTC in 2 weeks      KALEN SANTORO PA-C  3:05 PM  3/18/2024

## 2024-03-21 ENCOUNTER — TELEPHONE (OUTPATIENT)
Dept: OBGYN CLINIC | Facility: CLINIC | Age: 34
End: 2024-03-21

## 2024-03-21 PROBLEM — O99.012 ANEMIA COMPLICATING PREGNANCY IN SECOND TRIMESTER (HCC): Status: ACTIVE | Noted: 2024-03-21

## 2024-03-21 NOTE — TELEPHONE ENCOUNTER
Pt name and  verified. Pt informed of result. Pt has been taking an oral iron supplement regularly. Pt informed to keep taking supplement at this time; iron infusions will be ordered; pt aware it may take approximately 3 weeks for the infusion center to reach out to her for scheduling.    Infusion order faxed.

## 2024-03-21 NOTE — TELEPHONE ENCOUNTER
----- Message from Chintan Cantu MD sent at 3/16/2024  9:00 PM CDT -----  Low ferritin,  tx per office protocol

## 2024-03-28 ENCOUNTER — TELEPHONE (OUTPATIENT)
Dept: HEMATOLOGY/ONCOLOGY | Facility: HOSPITAL | Age: 34
End: 2024-03-28

## 2024-04-01 ENCOUNTER — ROUTINE PRENATAL (OUTPATIENT)
Dept: OBGYN CLINIC | Facility: CLINIC | Age: 34
End: 2024-04-01
Payer: COMMERCIAL

## 2024-04-01 VITALS
DIASTOLIC BLOOD PRESSURE: 71 MMHG | BODY MASS INDEX: 28 KG/M2 | WEIGHT: 152 LBS | SYSTOLIC BLOOD PRESSURE: 114 MMHG | HEART RATE: 86 BPM

## 2024-04-01 DIAGNOSIS — Z34.83 ENCOUNTER FOR SUPERVISION OF OTHER NORMAL PREGNANCY IN THIRD TRIMESTER (HCC): ICD-10-CM

## 2024-04-01 DIAGNOSIS — E03.9 HYPOTHYROIDISM DURING PREGNANCY IN THIRD TRIMESTER (HCC): Primary | ICD-10-CM

## 2024-04-01 DIAGNOSIS — O99.283 HYPOTHYROIDISM DURING PREGNANCY IN THIRD TRIMESTER (HCC): Primary | ICD-10-CM

## 2024-04-01 LAB
BILIRUBIN: NEGATIVE
GLUCOSE (URINE DIPSTICK): NEGATIVE MG/DL
KETONES (URINE DIPSTICK): NEGATIVE MG/DL
LEUKOCYTES: NEGATIVE
MULTISTIX LOT#: ABNORMAL NUMERIC
NITRITE, URINE: NEGATIVE
PH, URINE: 7 (ref 4.5–8)
PROTEIN (URINE DIPSTICK): NEGATIVE MG/DL
SPECIFIC GRAVITY: 1.01 (ref 1–1.03)
URINE-COLOR: YELLOW
UROBILINOGEN,SEMI-QN: 0.2 MG/DL (ref 0–1.9)

## 2024-04-01 NOTE — PROGRESS NOTES
Pt to get venofer  Fetal kick counts discussed with  patient, labor precautions given.  Continue 25 mcg synthroid, stable

## 2024-04-02 NOTE — PROGRESS NOTES
Outpatient Maternal-Fetal Medicine Follow-Up    Dear Dr. Cantu    Thank you for requesting an ultrasound and maternal-fetal medicine consultation on your patient Apolonia Haley.  As you are aware she is a 34 year old female  with a gaitan pregnancy and an Estimated Date of Delivery: 24.  She returned to maternal-fetal medicine today for a follow-up visit.  Her history was reviewed from her prior visit and there were no interval changes.    Antepartum Risk Factors  Hypothyroidism  SSRI use      PHYSICAL EXAMINATION:  /74   Pulse 99   Wt 155 lb (70.3 kg)   LMP 2023 (Approximate)   BMI 28.35 kg/m²   General: alert and oriented, no acute distress  Abdomen: gravid, soft, non-tender  Extremities: non-tender, no edema    OBSTETRIC ULTRASOUND  The patient had a follow-up growth ultrasound today which revealed normal interval fetal growth, BPP 8/8.    Ultrasound Findings:  Single IUP in cephalic presentation.    Placenta is anterior.   A 3 vessel cord is noted.  Cardiac activity is present at 163 bpm  EFW 1992 g ( 4 lb 6 oz); 55%.    BETY is  13.3 cm.  MVP is 4.3 cm  BPP is 8/8.     The fetal measurements are consistent with established EDC. No gross ultrasound evidence of structural abnormalities are seen today. The patient understands that ultrasound cannot rule out all structural and chromosomal abnormalities.    See Imaging Tab For Complete Ultrasound Report  I interpreted the results and reviewed them with the patient.    DISCUSSION  During her visit we discussed and reviewed the following issues:  HYPOTHYROIDISM  See prior M notes for a detailed review.    IMPRESSION:  IUP at 32w0d  Hypothyroidism, mildly increased TSH for pregnancy  SSRI use     RECOMMENDATIONS:  Continue care with Dr. Cantu  Monitor TSH now then every 4 weeks until her dose is stable, then reduce to every 8-12 weeks  Monitor for exacerbation of her mood disorder    Thank you for allowing me to participate in  the care of your patient.  Please do not hesitate to contact me if additional questions or concerns arise.      Bulmaro Pike M.D.    20 minutes spent in review of records, patient consultation, documentation and coordination of care.  The relevant clinical matter(s) are summarized above.     Note to patient and family  The 21st Century Cures Act makes medical notes available to patients in the interest of transparency.  However, please be advised that this is a medical document.  It is intended as [id number] communication.  It is written and medical language may contain abbreviations or verbiage that are technical and unfamiliar.  It may appear blunt or direct.  Medical documents are intended to carry relevant information, facts as evident, and the clinical opinion of the practitioner.

## 2024-04-09 ENCOUNTER — HOSPITAL ENCOUNTER (OUTPATIENT)
Dept: PERINATAL CARE | Facility: HOSPITAL | Age: 34
Discharge: HOME | End: 2024-04-09
Attending: OBSTETRICS & GYNECOLOGY
Payer: COMMERCIAL

## 2024-04-09 VITALS
SYSTOLIC BLOOD PRESSURE: 121 MMHG | BODY MASS INDEX: 28 KG/M2 | DIASTOLIC BLOOD PRESSURE: 74 MMHG | WEIGHT: 155 LBS | HEART RATE: 99 BPM

## 2024-04-09 DIAGNOSIS — O99.283 HYPOTHYROIDISM DURING PREGNANCY IN THIRD TRIMESTER (HCC): Primary | ICD-10-CM

## 2024-04-09 DIAGNOSIS — E03.9 HYPOTHYROIDISM DURING PREGNANCY IN THIRD TRIMESTER (HCC): Primary | ICD-10-CM

## 2024-04-09 DIAGNOSIS — E03.9 HYPOTHYROIDISM DURING PREGNANCY IN THIRD TRIMESTER (HCC): ICD-10-CM

## 2024-04-09 DIAGNOSIS — O99.283 HYPOTHYROIDISM DURING PREGNANCY IN THIRD TRIMESTER (HCC): ICD-10-CM

## 2024-04-09 PROCEDURE — 76819 FETAL BIOPHYS PROFIL W/O NST: CPT

## 2024-04-09 PROCEDURE — 76816 OB US FOLLOW-UP PER FETUS: CPT | Performed by: OBSTETRICS & GYNECOLOGY

## 2024-04-15 ENCOUNTER — OFFICE VISIT (OUTPATIENT)
Dept: OBGYN CLINIC | Facility: CLINIC | Age: 34
End: 2024-04-15
Payer: COMMERCIAL

## 2024-04-15 VITALS — SYSTOLIC BLOOD PRESSURE: 122 MMHG | WEIGHT: 151 LBS | DIASTOLIC BLOOD PRESSURE: 68 MMHG | BODY MASS INDEX: 28 KG/M2

## 2024-04-15 DIAGNOSIS — Z34.83 ENCOUNTER FOR SUPERVISION OF OTHER NORMAL PREGNANCY IN THIRD TRIMESTER (HCC): Primary | ICD-10-CM

## 2024-04-15 LAB
APPEARANCE: CLEAR
BILIRUBIN: NEGATIVE
GLUCOSE (URINE DIPSTICK): NEGATIVE MG/DL
KETONES (URINE DIPSTICK): NEGATIVE MG/DL
LEUKOCYTES: NEGATIVE
MULTISTIX LOT#: ABNORMAL NUMERIC
NITRITE, URINE: NEGATIVE
PH, URINE: 6.5 (ref 4.5–8)
PROTEIN (URINE DIPSTICK): NEGATIVE MG/DL
SPECIFIC GRAVITY: 1.01 (ref 1–1.03)
URINE-COLOR: YELLOW
UROBILINOGEN,SEMI-QN: 0.2 MG/DL (ref 0–1.9)

## 2024-04-15 PROCEDURE — 81003 URINALYSIS AUTO W/O SCOPE: CPT | Performed by: OBSTETRICS & GYNECOLOGY

## 2024-04-22 ENCOUNTER — OFFICE VISIT (OUTPATIENT)
Dept: HEMATOLOGY/ONCOLOGY | Facility: HOSPITAL | Age: 34
End: 2024-04-22
Attending: OBSTETRICS & GYNECOLOGY
Payer: COMMERCIAL

## 2024-04-22 VITALS
BODY MASS INDEX: 29.7 KG/M2 | HEART RATE: 88 BPM | RESPIRATION RATE: 16 BRPM | SYSTOLIC BLOOD PRESSURE: 113 MMHG | OXYGEN SATURATION: 97 % | DIASTOLIC BLOOD PRESSURE: 58 MMHG | TEMPERATURE: 98 F | WEIGHT: 161.38 LBS | HEIGHT: 62 IN

## 2024-04-22 DIAGNOSIS — O99.012 ANEMIA COMPLICATING PREGNANCY IN SECOND TRIMESTER (HCC): Primary | ICD-10-CM

## 2024-04-22 PROCEDURE — 96365 THER/PROPH/DIAG IV INF INIT: CPT

## 2024-04-22 NOTE — PROGRESS NOTES
Pt here for Venofer 300mg 1/3 . Pt denies any issues or concerns.      Ordering MD: Braeden Batista  Order Exp: After 2 more doses.     Pt tolerated infusion without difficulty or complaint. VSS post infusion, no s&s of reaction noted, patient denies any complaints.    Reviewed next apt date/time: 4/29 at 1300      Education Record  Learner:  Patient  Disease / Diagnosis: DESMOND in pregnancy.  Barriers / Limitations:  None  Method:  Discussion  General Topics:  Medication, Precautions, Procedure, Side effects and symptom management, and Plan of care reviewed  Outcome:  Shows understanding

## 2024-04-25 RX ORDER — LEVOTHYROXINE SODIUM 0.03 MG/1
25 TABLET ORAL
Qty: 90 TABLET | Refills: 1 | Status: SHIPPED | OUTPATIENT
Start: 2024-04-25

## 2024-04-28 NOTE — PROGRESS NOTES
Prenatal appt.  Pt feeling well.  No c/o.  Pregnancy counseling.   Fhts 145.  Fh 32 cm.  See pn note

## 2024-04-29 ENCOUNTER — ROUTINE PRENATAL (OUTPATIENT)
Dept: OBGYN CLINIC | Facility: CLINIC | Age: 34
End: 2024-04-29
Payer: COMMERCIAL

## 2024-04-29 ENCOUNTER — OFFICE VISIT (OUTPATIENT)
Dept: HEMATOLOGY/ONCOLOGY | Facility: HOSPITAL | Age: 34
End: 2024-04-29
Attending: OBSTETRICS & GYNECOLOGY
Payer: COMMERCIAL

## 2024-04-29 VITALS
BODY MASS INDEX: 29 KG/M2 | SYSTOLIC BLOOD PRESSURE: 122 MMHG | DIASTOLIC BLOOD PRESSURE: 79 MMHG | HEART RATE: 86 BPM | WEIGHT: 157 LBS

## 2024-04-29 VITALS
HEART RATE: 90 BPM | OXYGEN SATURATION: 98 % | SYSTOLIC BLOOD PRESSURE: 109 MMHG | DIASTOLIC BLOOD PRESSURE: 58 MMHG | TEMPERATURE: 98 F

## 2024-04-29 DIAGNOSIS — O99.283 HYPOTHYROIDISM DURING PREGNANCY IN THIRD TRIMESTER (HCC): Primary | ICD-10-CM

## 2024-04-29 DIAGNOSIS — O99.012 ANEMIA COMPLICATING PREGNANCY IN SECOND TRIMESTER (HCC): Primary | ICD-10-CM

## 2024-04-29 DIAGNOSIS — Z34.83 ENCOUNTER FOR SUPERVISION OF OTHER NORMAL PREGNANCY IN THIRD TRIMESTER (HCC): ICD-10-CM

## 2024-04-29 DIAGNOSIS — E03.9 HYPOTHYROIDISM DURING PREGNANCY IN THIRD TRIMESTER (HCC): Primary | ICD-10-CM

## 2024-04-29 LAB
APPEARANCE: CLEAR
BILIRUBIN: NEGATIVE
GLUCOSE (URINE DIPSTICK): NEGATIVE MG/DL
KETONES (URINE DIPSTICK): NEGATIVE MG/DL
MULTISTIX LOT#: ABNORMAL NUMERIC
NITRITE, URINE: NEGATIVE
PH, URINE: 6.5 (ref 4.5–8)
PROTEIN (URINE DIPSTICK): NEGATIVE MG/DL
SPECIFIC GRAVITY: 1.01 (ref 1–1.03)
URINE-COLOR: YELLOW
UROBILINOGEN,SEMI-QN: 0.2 MG/DL (ref 0–1.9)

## 2024-04-29 PROCEDURE — 96365 THER/PROPH/DIAG IV INF INIT: CPT

## 2024-04-29 NOTE — PROGRESS NOTES
Patient arrives for venofer 300 mg 2 of 3. Reports tolerating previous infusion well. Reviewed plan of car. PIV established with brisk blood return noted. Venofer given as ordered. Patient appeared to tolerated infusion, post vital signs WNL. PIV removed, site covered with 2x2 and coban. Discharged stable.

## 2024-05-06 ENCOUNTER — LAB ENCOUNTER (OUTPATIENT)
Dept: LAB | Facility: HOSPITAL | Age: 34
End: 2024-05-06
Attending: OBSTETRICS & GYNECOLOGY
Payer: COMMERCIAL

## 2024-05-06 ENCOUNTER — ROUTINE PRENATAL (OUTPATIENT)
Dept: OBGYN CLINIC | Facility: CLINIC | Age: 34
End: 2024-05-06
Payer: COMMERCIAL

## 2024-05-06 ENCOUNTER — OFFICE VISIT (OUTPATIENT)
Dept: HEMATOLOGY/ONCOLOGY | Facility: HOSPITAL | Age: 34
End: 2024-05-06
Attending: OBSTETRICS & GYNECOLOGY
Payer: COMMERCIAL

## 2024-05-06 VITALS
HEART RATE: 85 BPM | RESPIRATION RATE: 16 BRPM | TEMPERATURE: 98 F | OXYGEN SATURATION: 98 % | DIASTOLIC BLOOD PRESSURE: 71 MMHG | SYSTOLIC BLOOD PRESSURE: 123 MMHG

## 2024-05-06 VITALS
WEIGHT: 160 LBS | HEART RATE: 92 BPM | SYSTOLIC BLOOD PRESSURE: 127 MMHG | BODY MASS INDEX: 29 KG/M2 | DIASTOLIC BLOOD PRESSURE: 81 MMHG

## 2024-05-06 DIAGNOSIS — E03.9 HYPOTHYROIDISM DURING PREGNANCY IN THIRD TRIMESTER (HCC): ICD-10-CM

## 2024-05-06 DIAGNOSIS — O99.283 HYPOTHYROIDISM DURING PREGNANCY IN THIRD TRIMESTER (HCC): Primary | ICD-10-CM

## 2024-05-06 DIAGNOSIS — E03.9 HYPOTHYROIDISM DURING PREGNANCY IN THIRD TRIMESTER (HCC): Primary | ICD-10-CM

## 2024-05-06 DIAGNOSIS — O99.283 HYPOTHYROIDISM DURING PREGNANCY IN THIRD TRIMESTER (HCC): ICD-10-CM

## 2024-05-06 DIAGNOSIS — O99.012 ANEMIA COMPLICATING PREGNANCY IN SECOND TRIMESTER (HCC): Primary | ICD-10-CM

## 2024-05-06 LAB
T4 FREE SERPL-MCNC: 0.8 NG/DL (ref 0.8–1.7)
TSI SER-ACNC: 2.03 MIU/ML (ref 0.55–4.78)

## 2024-05-06 PROCEDURE — 36415 COLL VENOUS BLD VENIPUNCTURE: CPT

## 2024-05-06 PROCEDURE — 96366 THER/PROPH/DIAG IV INF ADDON: CPT

## 2024-05-06 PROCEDURE — 96365 THER/PROPH/DIAG IV INF INIT: CPT

## 2024-05-06 PROCEDURE — 84439 ASSAY OF FREE THYROXINE: CPT

## 2024-05-06 PROCEDURE — 84443 ASSAY THYROID STIM HORMONE: CPT

## 2024-05-06 NOTE — PROGRESS NOTES
Pt here for IV Venofer 300mg dose 3/3. Patient arrived ambulatory to unit. L AC PIV started, good blood return noted. Venofer infused over 90 min, patient tolerated. Post vitals stable. Pt denies any issues or concerns. PIV removed. Patient left ambulatory to exit.     Ordering MD: Braeden Batista     Pt tolerated infusion without difficulty or complaint. Reviewed next apt date/time: Following up with OB, due date 6/4/24      Education Record  Learner:  Patient  Disease / Diagnosis: DESMOND in pregnancy  Barriers / Limitations:  None  Method:  Discussion  General Topics:  Medication and Plan of care reviewed  Outcome:  Shows understanding

## 2024-05-14 ENCOUNTER — ROUTINE PRENATAL (OUTPATIENT)
Dept: OBGYN CLINIC | Facility: CLINIC | Age: 34
End: 2024-05-14

## 2024-05-14 VITALS
SYSTOLIC BLOOD PRESSURE: 110 MMHG | DIASTOLIC BLOOD PRESSURE: 60 MMHG | BODY MASS INDEX: 30 KG/M2 | WEIGHT: 162 LBS | HEART RATE: 109 BPM

## 2024-05-14 DIAGNOSIS — Z34.83 ENCOUNTER FOR SUPERVISION OF OTHER NORMAL PREGNANCY IN THIRD TRIMESTER (HCC): Primary | ICD-10-CM

## 2024-05-14 LAB
APPEARANCE: CLEAR
BILIRUBIN: NEGATIVE
GLUCOSE (URINE DIPSTICK): NEGATIVE MG/DL
KETONES (URINE DIPSTICK): NEGATIVE MG/DL
MULTISTIX LOT#: ABNORMAL NUMERIC
NITRITE, URINE: NEGATIVE
PH, URINE: 7 (ref 4.5–8)
PROTEIN (URINE DIPSTICK): NEGATIVE MG/DL
SPECIFIC GRAVITY: 1.01 (ref 1–1.03)
URINE-COLOR: YELLOW
UROBILINOGEN,SEMI-QN: 0.2 MG/DL (ref 0–1.9)

## 2024-05-14 PROCEDURE — 81003 URINALYSIS AUTO W/O SCOPE: CPT | Performed by: OBSTETRICS & GYNECOLOGY

## 2024-05-16 LAB — GROUP B STREP BY PCR FOR PCR OVT: NEGATIVE

## 2024-05-21 ENCOUNTER — ROUTINE PRENATAL (OUTPATIENT)
Dept: OBGYN CLINIC | Facility: CLINIC | Age: 34
End: 2024-05-21

## 2024-05-21 VITALS — DIASTOLIC BLOOD PRESSURE: 70 MMHG | SYSTOLIC BLOOD PRESSURE: 110 MMHG | WEIGHT: 163 LBS | BODY MASS INDEX: 30 KG/M2

## 2024-05-21 DIAGNOSIS — Z34.80 ENCOUNTER FOR SUPERVISION OF OTHER NORMAL PREGNANCY, UNSPECIFIED TRIMESTER (HCC): ICD-10-CM

## 2024-05-21 DIAGNOSIS — E03.9 HYPOTHYROIDISM DURING PREGNANCY IN THIRD TRIMESTER (HCC): Primary | ICD-10-CM

## 2024-05-21 DIAGNOSIS — O99.283 HYPOTHYROIDISM DURING PREGNANCY IN THIRD TRIMESTER (HCC): Primary | ICD-10-CM

## 2024-05-21 LAB
APPEARANCE: CLEAR
BILIRUBIN: NEGATIVE
GLUCOSE (URINE DIPSTICK): NEGATIVE MG/DL
KETONES (URINE DIPSTICK): NEGATIVE MG/DL
LEUKOCYTES: NEGATIVE
MULTISTIX LOT#: NORMAL NUMERIC
NITRITE, URINE: NEGATIVE
OCCULT BLOOD: NEGATIVE
PH, URINE: 7 (ref 4.5–8)
PROTEIN (URINE DIPSTICK): NEGATIVE MG/DL
SPECIFIC GRAVITY: 1.01 (ref 1–1.03)
URINE-COLOR: YELLOW
UROBILINOGEN,SEMI-QN: 0.2 MG/DL (ref 0–1.9)

## 2024-05-21 PROCEDURE — 81003 URINALYSIS AUTO W/O SCOPE: CPT | Performed by: OBSTETRICS & GYNECOLOGY

## 2024-05-28 ENCOUNTER — ROUTINE PRENATAL (OUTPATIENT)
Dept: OBGYN CLINIC | Facility: CLINIC | Age: 34
End: 2024-05-28

## 2024-05-28 VITALS
WEIGHT: 164 LBS | HEART RATE: 93 BPM | DIASTOLIC BLOOD PRESSURE: 82 MMHG | BODY MASS INDEX: 30 KG/M2 | SYSTOLIC BLOOD PRESSURE: 128 MMHG

## 2024-05-28 DIAGNOSIS — Z34.80 ENCOUNTER FOR SUPERVISION OF OTHER NORMAL PREGNANCY, UNSPECIFIED TRIMESTER (HCC): Primary | ICD-10-CM

## 2024-05-28 PROCEDURE — 81002 URINALYSIS NONAUTO W/O SCOPE: CPT | Performed by: OBSTETRICS & GYNECOLOGY

## 2024-06-04 ENCOUNTER — ROUTINE PRENATAL (OUTPATIENT)
Dept: OBGYN CLINIC | Facility: CLINIC | Age: 34
End: 2024-06-04
Payer: COMMERCIAL

## 2024-06-04 ENCOUNTER — HOSPITAL ENCOUNTER (OUTPATIENT)
Facility: HOSPITAL | Age: 34
Discharge: HOME | End: 2024-06-04
Attending: OBSTETRICS & GYNECOLOGY | Admitting: OBSTETRICS & GYNECOLOGY
Payer: COMMERCIAL

## 2024-06-04 VITALS
HEART RATE: 94 BPM | TEMPERATURE: 98 F | SYSTOLIC BLOOD PRESSURE: 133 MMHG | RESPIRATION RATE: 18 BRPM | DIASTOLIC BLOOD PRESSURE: 89 MMHG

## 2024-06-04 VITALS — SYSTOLIC BLOOD PRESSURE: 132 MMHG | BODY MASS INDEX: 30 KG/M2 | WEIGHT: 164 LBS | DIASTOLIC BLOOD PRESSURE: 90 MMHG

## 2024-06-04 DIAGNOSIS — Z34.83 ENCOUNTER FOR SUPERVISION OF OTHER NORMAL PREGNANCY IN THIRD TRIMESTER (HCC): Primary | ICD-10-CM

## 2024-06-04 PROCEDURE — 99213 OFFICE O/P EST LOW 20 MIN: CPT

## 2024-06-04 PROCEDURE — 59025 FETAL NON-STRESS TEST: CPT

## 2024-06-04 NOTE — TRIAGE
[facility]  part of [facility]      Triage Note    Apolonia Haley Patient Status:  Outpatient    1990 MRN [id number]   Location [facility] Attending Wil Rucker MD   Hosp Day # 0 PCP Christin Meza MD      Para:   Estimated Date of Delivery: 24  Gestation: 40w0d    Chief Complaint    Non-stress Test         Allergies:    Allergies   Allergen Reactions    Dextromethorphan-Guaifenesin DIZZINESS       No orders of the defined types were placed in this encounter.      Lab Results   Component Value Date    WBC 8.5 2024    HGB 12.9 2024    HCT 37.5 2024    .0 2024    CREATSERUM 0.65 2021    BUN 13.0 2021     2021    K 4.23 2021     2021    CO2 24.6 2021    GLU 86 2021    CA 9.2 2021    TP 7.0 2018    AST 13 2018    ALT 14 2021    T4F 0.8 2024    TSH 2.034 2024       Clinitek UA  Lab Results   Component Value Date    GLUUR Normal 2023    SPECGRAVITY 1.015 2024    URINECUL No Growth 2 Days 2023       UA  Lab Results   Component Value Date    COLORUR Light-Yellow 2023    CLARITY Clear 2023    SPECGRAVITY 1.015 2024    PROUR Negative 2023    GLUUR Normal 2023    KETUR Negative 2023    BILUR Negative 2023    BLOODURINE Negative 2023    NITRITE Negative 2024    UROBILINOGEN Normal 2023    LEUUR Negative 2023       Vitals:    24 1215   BP: 133/89   BP Location: Left arm   Pulse: 94   Resp: 18   Temp: 98.4 °F (36.9 °C)   TempSrc: Oral       NST:  Reactive  Variability: Moderate           Accelerations: Yes           Decelerations: None            Baseline: 125 BPM           Uterine Irritability: No           Contractions: Irregular                                        Acoustic Stimulator: No           Nonstress Test Interpretation:  Reactive           Nonstress Test Second Interpretation: Reactive          FHR Category: Category I          Chief Complaint   Patient presents with    Non-stress Test     Pt sent in from clinic for NST due to postdates. Pt states she is feeling baby moving. Denies feeling any contractions, leaking of fluid or vaginal bleeding.       @ 40 0/7wks.  sent from office for NST for post dates.   NST reactive.  Dr. Rucker informed, and reviewed NST.   order received to discharge pt. home to return to office on  24 @ 0915.     Discharged home  Ambulatory and in stable condition with written and verbal labor, and preeclampsia instructions. Patient verbalizes understanding of information given.       Gricel REYNOSO RN  2024 1:19 PM

## 2024-06-04 NOTE — PROGRESS NOTES
Pt is a 34 year old female admitted to TR1/TR1-A.     Chief Complaint   Patient presents with    Non-stress Test     Pt sent in from clinic for NST due to postdates. Pt states she is feeling baby moving. Denies feeling any contractions, leaking of fluid or vaginal bleeding.      Pt is  40w0d intra-uterine pregnancy.  History obtained, consents signed. Oriented to room, staff, and plan of care.

## 2024-06-05 NOTE — PROGRESS NOTES
Counseled and offered IOL, pt wants to wait until 40 6/7 weeks  staff to schedule IOL   pt agreed to nst today

## 2024-06-06 ENCOUNTER — ROUTINE PRENATAL (OUTPATIENT)
Dept: OBGYN CLINIC | Facility: CLINIC | Age: 34
End: 2024-06-06

## 2024-06-06 ENCOUNTER — TELEPHONE (OUTPATIENT)
Dept: OBGYN UNIT | Facility: HOSPITAL | Age: 34
End: 2024-06-06

## 2024-06-06 VITALS
SYSTOLIC BLOOD PRESSURE: 124 MMHG | DIASTOLIC BLOOD PRESSURE: 68 MMHG | WEIGHT: 165 LBS | BODY MASS INDEX: 30 KG/M2 | HEART RATE: 103 BPM

## 2024-06-06 DIAGNOSIS — O48.0 POST-TERM PREGNANCY, 40-42 WEEKS OF GESTATION (HCC): Primary | ICD-10-CM

## 2024-06-06 DIAGNOSIS — Z34.83 ENCOUNTER FOR SUPERVISION OF OTHER NORMAL PREGNANCY IN THIRD TRIMESTER (HCC): ICD-10-CM

## 2024-06-06 PROCEDURE — 81003 URINALYSIS AUTO W/O SCOPE: CPT | Performed by: OBSTETRICS & GYNECOLOGY

## 2024-06-06 PROCEDURE — 76815 OB US LIMITED FETUS(S): CPT | Performed by: OBSTETRICS & GYNECOLOGY

## 2024-06-06 NOTE — PROGRESS NOTES
Fetal kick counts discussed with  patient, labor precautions given.  Iol 4 days for post dates

## 2024-06-09 ENCOUNTER — ANESTHESIA EVENT (OUTPATIENT)
Dept: OBGYN UNIT | Facility: HOSPITAL | Age: 34
End: 2024-06-09
Payer: COMMERCIAL

## 2024-06-09 ENCOUNTER — ANESTHESIA (OUTPATIENT)
Dept: OBGYN UNIT | Facility: HOSPITAL | Age: 34
End: 2024-06-09
Payer: COMMERCIAL

## 2024-06-09 ENCOUNTER — HOSPITAL ENCOUNTER (INPATIENT)
Facility: HOSPITAL | Age: 34
LOS: 3 days | Discharge: HOME | End: 2024-06-12
Attending: STUDENT IN AN ORGANIZED HEALTH CARE EDUCATION/TRAINING PROGRAM | Admitting: STUDENT IN AN ORGANIZED HEALTH CARE EDUCATION/TRAINING PROGRAM
Payer: COMMERCIAL

## 2024-06-09 PROBLEM — Z34.90 PREGNANCY (HCC): Status: ACTIVE | Noted: 2024-06-09

## 2024-06-09 LAB
ANTIBODY SCREEN: NEGATIVE
BASOPHILS # BLD AUTO: 0.04 X10(3) UL (ref 0–0.2)
BASOPHILS NFR BLD AUTO: 0.3 %
DEPRECATED RDW RBC AUTO: 42.5 FL (ref 35.1–46.3)
EOSINOPHIL # BLD AUTO: 0.06 X10(3) UL (ref 0–0.7)
EOSINOPHIL NFR BLD AUTO: 0.5 %
ERYTHROCYTE [DISTWIDTH] IN BLOOD BY AUTOMATED COUNT: 12.9 % (ref 11–15)
HCT VFR BLD AUTO: 45.5 %
HGB BLD-MCNC: 15.6 G/DL
IMM GRANULOCYTES # BLD AUTO: 0.1 X10(3) UL (ref 0–1)
IMM GRANULOCYTES NFR BLD: 0.8 %
LYMPHOCYTES # BLD AUTO: 1.74 X10(3) UL (ref 1–4)
LYMPHOCYTES NFR BLD AUTO: 13.9 %
MCH RBC QN AUTO: 30.7 PG (ref 26–34)
MCHC RBC AUTO-ENTMCNC: 34.3 G/DL (ref 31–37)
MCV RBC AUTO: 89.6 FL
MONOCYTES # BLD AUTO: 0.64 X10(3) UL (ref 0.1–1)
MONOCYTES NFR BLD AUTO: 5.1 %
NEUTROPHILS # BLD AUTO: 9.93 X10 (3) UL (ref 1.5–7.7)
NEUTROPHILS # BLD AUTO: 9.93 X10(3) UL (ref 1.5–7.7)
NEUTROPHILS NFR BLD AUTO: 79.4 %
PLATELET # BLD AUTO: 209 10(3)UL (ref 150–450)
RBC # BLD AUTO: 5.08 X10(6)UL
RH BLOOD TYPE: POSITIVE
T PALLIDUM AB SER QL IA: NONREACTIVE
WBC # BLD AUTO: 12.5 X10(3) UL (ref 4–11)

## 2024-06-09 PROCEDURE — 86900 BLOOD TYPING SEROLOGIC ABO: CPT | Performed by: STUDENT IN AN ORGANIZED HEALTH CARE EDUCATION/TRAINING PROGRAM

## 2024-06-09 PROCEDURE — 86780 TREPONEMA PALLIDUM: CPT | Performed by: STUDENT IN AN ORGANIZED HEALTH CARE EDUCATION/TRAINING PROGRAM

## 2024-06-09 PROCEDURE — 85025 COMPLETE CBC W/AUTO DIFF WBC: CPT | Performed by: STUDENT IN AN ORGANIZED HEALTH CARE EDUCATION/TRAINING PROGRAM

## 2024-06-09 PROCEDURE — 99214 OFFICE O/P EST MOD 30 MIN: CPT

## 2024-06-09 PROCEDURE — 10907ZC DRAINAGE OF AMNIOTIC FLUID, THERAPEUTIC FROM PRODUCTS OF CONCEPTION, VIA NATURAL OR ARTIFICIAL OPENING: ICD-10-PCS | Performed by: STUDENT IN AN ORGANIZED HEALTH CARE EDUCATION/TRAINING PROGRAM

## 2024-06-09 PROCEDURE — 86901 BLOOD TYPING SEROLOGIC RH(D): CPT | Performed by: STUDENT IN AN ORGANIZED HEALTH CARE EDUCATION/TRAINING PROGRAM

## 2024-06-09 PROCEDURE — 86850 RBC ANTIBODY SCREEN: CPT | Performed by: STUDENT IN AN ORGANIZED HEALTH CARE EDUCATION/TRAINING PROGRAM

## 2024-06-09 RX ORDER — BUPIVACAINE HYDROCHLORIDE 2.5 MG/ML
20 INJECTION, SOLUTION EPIDURAL; INFILTRATION; INTRACAUDAL ONCE
Status: COMPLETED | OUTPATIENT
Start: 2024-06-09 | End: 2024-06-09

## 2024-06-09 RX ORDER — LIDOCAINE HYDROCHLORIDE 10 MG/ML
30 INJECTION, SOLUTION EPIDURAL; INFILTRATION; INTRACAUDAL; PERINEURAL ONCE
Status: DISCONTINUED | OUTPATIENT
Start: 2024-06-09 | End: 2024-06-10

## 2024-06-09 RX ORDER — LIDOCAINE HYDROCHLORIDE 10 MG/ML
INJECTION, SOLUTION EPIDURAL; INFILTRATION; INTRACAUDAL; PERINEURAL AS NEEDED
Status: DISCONTINUED | OUTPATIENT
Start: 2024-06-09 | End: 2024-06-10 | Stop reason: SURG

## 2024-06-09 RX ORDER — ACETAMINOPHEN 500 MG
1000 TABLET ORAL EVERY 6 HOURS PRN
Status: DISCONTINUED | OUTPATIENT
Start: 2024-06-09 | End: 2024-06-10

## 2024-06-09 RX ORDER — CITRIC ACID/SODIUM CITRATE 334-500MG
30 SOLUTION, ORAL ORAL AS NEEDED
Status: DISCONTINUED | OUTPATIENT
Start: 2024-06-09 | End: 2024-06-10

## 2024-06-09 RX ORDER — ONDANSETRON 2 MG/ML
4 INJECTION INTRAMUSCULAR; INTRAVENOUS EVERY 6 HOURS PRN
Status: DISCONTINUED | OUTPATIENT
Start: 2024-06-09 | End: 2024-06-10

## 2024-06-09 RX ORDER — SODIUM CHLORIDE, SODIUM LACTATE, POTASSIUM CHLORIDE, CALCIUM CHLORIDE 600; 310; 30; 20 MG/100ML; MG/100ML; MG/100ML; MG/100ML
INJECTION, SOLUTION INTRAVENOUS CONTINUOUS
Status: DISCONTINUED | OUTPATIENT
Start: 2024-06-09 | End: 2024-06-10

## 2024-06-09 RX ORDER — ACETAMINOPHEN 500 MG
500 TABLET ORAL EVERY 6 HOURS PRN
Status: DISCONTINUED | OUTPATIENT
Start: 2024-06-09 | End: 2024-06-10

## 2024-06-09 RX ORDER — BUPIVACAINE HYDROCHLORIDE 2.5 MG/ML
20 INJECTION, SOLUTION EPIDURAL; INFILTRATION; INTRACAUDAL ONCE
Status: DISCONTINUED | OUTPATIENT
Start: 2024-06-09 | End: 2024-06-10

## 2024-06-09 RX ORDER — NALBUPHINE HYDROCHLORIDE 10 MG/ML
2.5 INJECTION, SOLUTION INTRAMUSCULAR; INTRAVENOUS; SUBCUTANEOUS
Status: DISCONTINUED | OUTPATIENT
Start: 2024-06-09 | End: 2024-06-10

## 2024-06-09 RX ORDER — BUPIVACAINE HCL/0.9 % NACL/PF 0.25 %
5 PLASTIC BAG, INJECTION (ML) EPIDURAL AS NEEDED
Status: DISCONTINUED | OUTPATIENT
Start: 2024-06-09 | End: 2024-06-10

## 2024-06-09 RX ORDER — IBUPROFEN 600 MG/1
600 TABLET ORAL ONCE AS NEEDED
Status: DISCONTINUED | OUTPATIENT
Start: 2024-06-09 | End: 2024-06-10

## 2024-06-09 RX ORDER — BUPIVACAINE HCL/0.9 % NACL/PF 0.25 %
PLASTIC BAG, INJECTION (ML) EPIDURAL
Status: DISPENSED
Start: 2024-06-09 | End: 2024-06-10

## 2024-06-09 RX ORDER — BUPIVACAINE HYDROCHLORIDE 2.5 MG/ML
INJECTION, SOLUTION EPIDURAL; INFILTRATION; INTRACAUDAL
Status: DISPENSED
Start: 2024-06-09 | End: 2024-06-10

## 2024-06-09 RX ORDER — LIDOCAINE HYDROCHLORIDE AND EPINEPHRINE 15; 5 MG/ML; UG/ML
INJECTION, SOLUTION EPIDURAL AS NEEDED
Status: DISCONTINUED | OUTPATIENT
Start: 2024-06-09 | End: 2024-06-10 | Stop reason: SURG

## 2024-06-09 RX ORDER — TERBUTALINE SULFATE 1 MG/ML
0.25 INJECTION, SOLUTION SUBCUTANEOUS AS NEEDED
Status: DISCONTINUED | OUTPATIENT
Start: 2024-06-09 | End: 2024-06-10

## 2024-06-09 RX ORDER — DEXTROSE, SODIUM CHLORIDE, SODIUM LACTATE, POTASSIUM CHLORIDE, AND CALCIUM CHLORIDE 5; .6; .31; .03; .02 G/100ML; G/100ML; G/100ML; G/100ML; G/100ML
INJECTION, SOLUTION INTRAVENOUS AS NEEDED
Status: DISCONTINUED | OUTPATIENT
Start: 2024-06-09 | End: 2024-06-10

## 2024-06-09 RX ADMIN — BUPIVACAINE HYDROCHLORIDE 10 ML: 2.5 INJECTION, SOLUTION EPIDURAL; INFILTRATION; INTRACAUDAL at 17:37:00

## 2024-06-09 RX ADMIN — LIDOCAINE HYDROCHLORIDE AND EPINEPHRINE 5 ML: 15; 5 INJECTION, SOLUTION EPIDURAL at 17:36:00

## 2024-06-09 RX ADMIN — LIDOCAINE HYDROCHLORIDE 3 ML: 10 INJECTION, SOLUTION EPIDURAL; INFILTRATION; INTRACAUDAL; PERINEURAL at 17:34:00

## 2024-06-09 NOTE — PROGRESS NOTES
Patient endorsed to Janina MORALES   Hydroxyzine Counseling: Patient advised that the medication is sedating and not to drive a car after taking this medication.  Patient informed of potential adverse effects including but not limited to dry mouth, urinary retention, and blurry vision.  The patient verbalized understanding of the proper use and possible adverse effects of hydroxyzine.  All of the patient's questions and concerns were addressed.

## 2024-06-09 NOTE — PROGRESS NOTES
Pt is a 34 year old female admitted to TR1/TR1-A.     Chief Complaint   Patient presents with    R/o Labor     Patient reports contractions since 630 that are now 3-5 minutes apart      Pt is  40w5d intra-uterine pregnancy.  History obtained, consents signed. Oriented to room, staff, and plan of care.

## 2024-06-09 NOTE — ANESTHESIA PREPROCEDURE EVALUATION
Anesthesia PreOp Note    HPI:     Apolonia Haley is a 34 year old female who presents for preoperative consultation requested by: * No surgeons listed *    Date of Surgery: 6/9/2024    * No procedures listed *  Indication: * No pre-op diagnosis entered *    Relevant Problems   No relevant active problems       NPO:                         History Review:  Patient Active Problem List    Diagnosis Date Noted    Pregnancy (Former[facility]) 06/09/2024    Anemia complicating pregnancy in second trimester ([facility]) 03/21/2024    Hypothyroidism during pregnancy ([facility]) 11/27/2023    Low serum ferritin level 11/23/2023    Bleeding in early pregnancy ([facility]) 05/16/2023    SAVAGE (generalized anxiety disorder) 08/22/2018    Allergic rhinitis due to pollen, unspecified seasonality 08/22/2018    GERD without esophagitis 04/14/2017       Past Medical History:    Esophageal reflux    Thyroid disease       Past Surgical History:   Procedure Laterality Date    Dolphin teeth removed  2015       Medications Prior to Admission   Medication Sig Dispense Refill Last Dose    LEVOTHYROXINE 25 MCG Oral Tab TAKE 1 TABLET BY MOUTH BEFORE BREAKFAST. 90 tablet 1 6/9/2024    Ferrous Sulfate 325 (65 Fe) MG Oral Tab Take 1 tablet (325 mg total) by mouth every other day. 45 tablet 1 6/8/2024    escitalopram 10 MG Oral Tab Take 1 tablet (10 mg total) by mouth daily.   6/8/2024    prenatal vitamin with DHA 27-0.8-228 MG Oral Cap Take 1 capsule by mouth daily.   6/8/2024     Current Facility-Administered Medications Ordered in Epic   Medication Dose Route Frequency Provider Last Rate Last Admin    acetaminophen (Tylenol Extra Strength) tab 500 mg  500 mg Oral Q6H PRN Lorie Crawford MD        acetaminophen (Tylenol Extra Strength) tab 1,000 mg  1,000 mg Oral Q6H PRN Lorie Crawford MD        ibuprofen (Motrin) tab 600 mg  600 mg Oral Once PRN Lorie Crawford MD        ondansetron (Zofran) 4 MG/2ML injection 4 mg  4 mg Intravenous Q6H PRN Lorie Crawford MD         oxyTOCIN in sodium chloride 0.9% (Pitocin) 30 Units/500mL infusion premix  62.5-900 rakesh-units/min Intravenous Continuous Lorie Crawford MD        terbutaline (Brethine) 1 MG/ML injection 0.25 mg  0.25 mg Subcutaneous PRN Lorie Crawford MD        sodium citrate-citric acid (Bicitra) 500-334 MG/5ML oral solution 30 mL  30 mL Oral PRN Lorie Crawford MD        lidocaine PF (Xylocaine-MPF) 1% injection  30 mL Intradermal Once Lorie Crawford MD        lactated ringers infusion   Intravenous Continuous Lorie Crawford MD        dextrose in lactated ringers 5% infusion   Intravenous PRN Lorie Crawford MD        lactated ringers IV bolus 500 mL  500 mL Intravenous PRN Lorie Crawford MD        lactated ringers IV bolus 1,000 mL  1,000 mL Intravenous Once Yoel Kothari MD        fentaNYL-bupivacaine 2 mcg/mL-0.125% in sodium chloride 0.9% 100 mL EPIDURAL infusion premix   Epidural Continuous Yoel Kothari MD        fentaNYL (Sublimaze) 50 mcg/mL injection 100 mcg  100 mcg Epidural Once Yoel Kothari MD        bupivacaine PF (Marcaine) 0.25% injection  20 mL Epidural Once Yoel Kothari MD        EPHEDrine (PF) 25 MG/5 ML injection 5 mg  5 mg Intravenous PRN Yoel Kothari MD        nalbuphine (Nubain) 10 mg/mL injection 2.5 mg  2.5 mg Intravenous Q15 Min PRN Yoel Kothari MD        lactated ringers IV bolus 1,000 mL  1,000 mL Intravenous Once Yoel Kothari MD        fentaNYL-bupivacaine 2 mcg/mL-0.125% in sodium chloride 0.9% 100 mL EPIDURAL infusion premix   Epidural Continuous Yoel Kothari MD        fentaNYL (Sublimaze) 50 mcg/mL injection 100 mcg  100 mcg Epidural Once Yole Kothari MD        bupivacaine PF (Marcaine) 0.25% injection  20 mL Epidural Once Yoel Kothari MD        EPHEDrine (PF) 25 MG/5 ML injection 5 mg  5 mg Intravenous PRN Yoel Kothari MD        nalbuphine (Nubain) 10 mg/mL injection 2.5 mg  2.5 mg  Intravenous Q15 Min PRN Yoel Kothari MD        fentaNYL (Sublimaze) 50 mcg/mL injection             bupivacaine PF (Marcaine) 0.25 % injection             EPHEDrine (PF) 25 MG/5 ML injection             fentaNYL-bupivacaine in sodium chloride 0.9% 2 mcg/mL-0.125% EPIDURAL infusion premix              No current Epic-ordered outpatient medications on file.       Allergies   Allergen Reactions    Dextromethorphan-Guaifenesin DIZZINESS       Family History   Problem Relation Age of Onset    Hypertension Father     Lipids Father     High Cholesterol Father     Hypertension Mother     High Cholesterol Mother     Other (Gilbert's disease) Mother     Other (Alzheimer's) Maternal Grandmother     Cancer Maternal Grandfather     Diabetes Paternal Grandmother     Other (Gilbert's disease) Brother      Social History     Socioeconomic History    Marital status:    Tobacco Use    Smoking status: Never    Smokeless tobacco: Never   Vaping Use    Vaping status: Never Used   Substance and Sexual Activity    Alcohol use: Not Currently     Comment: socially    Drug use: Never       Available pre-op labs reviewed.  Lab Results   Component Value Date    WBC 12.5 (H) 06/09/2024    RBC 5.08 06/09/2024    HGB 15.6 06/09/2024    HCT 45.5 06/09/2024    MCV 89.6 06/09/2024    MCH 30.7 06/09/2024    MCHC 34.3 06/09/2024    RDW 12.9 06/09/2024    .0 06/09/2024             Vital Signs:  There is no height or weight on file to calculate BMI.   blood pressure is 137/78 and her pulse is 86.   Vitals:    06/09/24 1630   BP: 137/78   Pulse: 86        Anesthesia Evaluation     Patient summary reviewed and Nursing notes reviewed    No history of anesthetic complications   Airway   Mallampati: II  TM distance: >3 FB  Neck ROM: full  Dental - Dentition appears grossly intact     Pulmonary - negative ROS and normal exam   Cardiovascular - negative ROS and normal exam  Exercise tolerance: good    Neuro/Psych - negative ROS      GI/Hepatic/Renal    (+) GERD well controlled    Endo/Other    (+) hypothyroidism  Abdominal  - normal exam                 Anesthesia Plan:   ASA:  2  Emergent    Plan:   Epidural  Post-op Pain Management: Continuous epidural and PCEA  Informed Consent Plan and Risks Discussed With:  Patient      I have informed Apolonia Haley of the nature of the anesthetic plan, benefits, risks including possible dental damage if relevant, major complications, and any alternative forms of anesthetic management.   All of the patient's questions were answered to the best of my ability. The patient desires the anesthetic management as planned.  I have informed Apolonia Haley  of the risks of neuraxial anesthesia including, but not limited to: failure, headache, backache, spinal, unilateral/patchy block, difficulty breathing, infection, bleeding, nerve damage, paralysis, death. The patient desires the proposed neuraxial anesthetic as planned.   JAYY MERAZ MD  6/9/2024 5:26 PM  Present on Admission:  **None**

## 2024-06-09 NOTE — ANESTHESIA PROCEDURE NOTES
Labor Analgesia    Date/Time: 6/9/2024 5:34 PM    Performed by: Yoel Kothari MD  Authorized by: Yoel Kothari MD      General Information and Staff    Start Time:  6/9/2024 5:34 PM  End Time:  6/9/2024 5:38 PM  Anesthesiologist:  Yoel Kothari MD  Performed by:  Anesthesiologist  Patient Location:  OB  Site Identification: surface landmarks    Reason for Block: labor epidural    Preanesthetic Checklist: patient identified, IV checked, site marked, risks and benefits discussed, monitors and equipment checked, pre-op evaluation, timeout performed, IV bolus, anesthesia consent and sterile technique used      Procedure Details    Patient Position:  Sitting  Prep: ChloraPrep and patient draped    Monitoring:  Heart rate, cardiac monitor and continuous pulse ox  Approach:  Midline    Epidural Needle    Injection Technique:  QUINTIN air  Needle Type:  Tuohy  Needle Gauge:  18 G  Needle Length:  3.5 in  Needle Insertion Depth:  5  Location:  L3-4    Spinal Needle      Catheter    Catheter Type:  Multi-orifice  Catheter Size:  20 G  Catheter at Skin Depth:  9  Test Dose:  Negative    Assessment    Sensory Level:  T10    Additional Comments

## 2024-06-10 PROCEDURE — 0KQM0ZZ REPAIR PERINEUM MUSCLE, OPEN APPROACH: ICD-10-PCS | Performed by: STUDENT IN AN ORGANIZED HEALTH CARE EDUCATION/TRAINING PROGRAM

## 2024-06-10 RX ORDER — ACETAMINOPHEN 500 MG
500 TABLET ORAL EVERY 6 HOURS PRN
Status: DISCONTINUED | OUTPATIENT
Start: 2024-06-10 | End: 2024-06-12

## 2024-06-10 RX ORDER — AMMONIA INHALANTS 0.04 G/.3ML
0.3 INHALANT RESPIRATORY (INHALATION) AS NEEDED
Status: DISCONTINUED | OUTPATIENT
Start: 2024-06-10 | End: 2024-06-12

## 2024-06-10 RX ORDER — IBUPROFEN 600 MG/1
600 TABLET ORAL EVERY 6 HOURS
Status: DISCONTINUED | OUTPATIENT
Start: 2024-06-10 | End: 2024-06-12

## 2024-06-10 RX ORDER — ONDANSETRON 2 MG/ML
4 INJECTION INTRAMUSCULAR; INTRAVENOUS EVERY 6 HOURS PRN
Status: DISCONTINUED | OUTPATIENT
Start: 2024-06-10 | End: 2024-06-12

## 2024-06-10 RX ORDER — BISACODYL 10 MG
10 SUPPOSITORY, RECTAL RECTAL ONCE AS NEEDED
Status: DISCONTINUED | OUTPATIENT
Start: 2024-06-10 | End: 2024-06-12

## 2024-06-10 RX ORDER — SIMETHICONE 80 MG
80 TABLET,CHEWABLE ORAL 3 TIMES DAILY PRN
Status: DISCONTINUED | OUTPATIENT
Start: 2024-06-10 | End: 2024-06-12

## 2024-06-10 RX ORDER — CHOLECALCIFEROL (VITAMIN D3) 25 MCG
1 TABLET,CHEWABLE ORAL DAILY
Status: DISCONTINUED | OUTPATIENT
Start: 2024-06-10 | End: 2024-06-12

## 2024-06-10 RX ORDER — LEVOTHYROXINE SODIUM 0.03 MG/1
25 TABLET ORAL
Status: DISCONTINUED | OUTPATIENT
Start: 2024-06-10 | End: 2024-06-12

## 2024-06-10 RX ORDER — MISOPROSTOL 200 UG/1
1000 TABLET ORAL ONCE
Status: COMPLETED | OUTPATIENT
Start: 2024-06-10 | End: 2024-06-10

## 2024-06-10 RX ORDER — MAGNESIUM OXIDE 400 MG (241.3 MG MAGNESIUM) TABLET
325 TABLET EVERY OTHER DAY
Status: DISCONTINUED | OUTPATIENT
Start: 2024-06-10 | End: 2024-06-12

## 2024-06-10 RX ORDER — MISOPROSTOL 200 UG/1
TABLET ORAL
Status: COMPLETED
Start: 2024-06-10 | End: 2024-06-10

## 2024-06-10 RX ORDER — ESCITALOPRAM OXALATE 10 MG/1
10 TABLET ORAL DAILY
Status: DISCONTINUED | OUTPATIENT
Start: 2024-06-10 | End: 2024-06-12

## 2024-06-10 RX ORDER — DOCUSATE SODIUM 100 MG/1
100 CAPSULE, LIQUID FILLED ORAL
Status: DISCONTINUED | OUTPATIENT
Start: 2024-06-10 | End: 2024-06-12

## 2024-06-10 RX ORDER — ACETAMINOPHEN 500 MG
1000 TABLET ORAL EVERY 6 HOURS PRN
Status: DISCONTINUED | OUTPATIENT
Start: 2024-06-10 | End: 2024-06-12

## 2024-06-10 RX ORDER — BENZOCAINE/MENTHOL 6 MG-10 MG
1 LOZENGE MUCOUS MEMBRANE EVERY 6 HOURS PRN
Status: DISCONTINUED | OUTPATIENT
Start: 2024-06-10 | End: 2024-06-12

## 2024-06-10 NOTE — PLAN OF CARE
Problem: Patient Centered Care  Goal: Patient preferences are identified and integrated in the patient's plan of care  Description: Interventions:  - What would you like us to know as we care for you?   - Provide timely, complete, and accurate information to patient/family  - Incorporate patient and family knowledge, values, beliefs, and cultural backgrounds into the planning and delivery of care  - Encourage patient/family to participate in care and decision-making at the level they choose  - Honor patient and family perspectives and choices  Outcome: Progressing     Problem: Patient/Family Goals  Goal: Patient/Family Long Term Goal  Description: Patient's Long Term Goal:     Interventions:  -   - See additional Care Plan goals for specific interventions  Outcome: Progressing  Goal: Patient/Family Short Term Goal  Description: Patient's Short Term Goal:     Interventions:   -  - See additional Care Plan goals for specific interventions  Outcome: Progressing     Problem: BIRTH - VAGINAL/ SECTION  Goal: Fetal and maternal status remain reassuring during the birth process  Description: INTERVENTIONS:  - Monitor vital signs  - Monitor fetal heart rate  - Monitor uterine activity  - Monitor labor progression (vaginal delivery)  - DVT prophylaxis (C/S delivery)  - Surgical antibiotic prophylaxis (C/S delivery)  Outcome: Progressing     Problem: PAIN - ADULT  Goal: Verbalizes/displays adequate comfort level or patient's stated pain goal  Description: INTERVENTIONS:  - Encourage pt to monitor pain and request assistance  - Assess pain using appropriate pain scale  - Administer analgesics based on type and severity of pain and evaluate response  - Implement non-pharmacological measures as appropriate and evaluate response  - Consider cultural and social influences on pain and pain management  - Manage/alleviate anxiety  - Utilize distraction and/or relaxation techniques  - Monitor for opioid side effects  - Notify  MD/LIP if interventions unsuccessful or patient reports new pain  - Anticipate increased pain with activity and pre-medicate as appropriate  Outcome: Progressing     Problem: ANXIETY  Goal: Will report anxiety at manageable levels  Description: INTERVENTIONS:  - Administer medication as ordered  - Teach and rehearse alternative coping skills  - Provide emotional support with 1:1 interaction with staff  Outcome: Progressing

## 2024-06-10 NOTE — LACTATION NOTE
LACTATION NOTE - MOTHER           Problems identified  Problems identified: Knowledge deficit    Maternal history  Maternal history: Anxiety;Hypothyroid    Breastfeeding goal  Breastfeeding goal: To maintain breast milk feeding per patient goal    Maternal Assessment  Bilateral Breasts: Soft;Symmetrical  Bilateral Nipples: Colostrum easily expressed;Slightly everted/short  Right Nipple: Compression stripe  Prior breastfeeding experience (comment below): Primip  Breastfeeding Assistance: Breastfeeding assistance provided with permission;Hand expression provided with permission    Pain assessment  Pain, additional: Pain w/initial sucks only  Treatment of Sore Nipples: Lanolin    Guidelines for use of:  Equipment: Lanolin              Mother was breastfeeding as I entered the room.  Made minor positioning suggestions. Baby having a few good sucks and swallows. Not really staying on sustaining, lots of attempts to latch, and giving good drops with hand expression. Encouraged STS. Discussed hand expression and spoon feeding if the infant is too sleepy to nurse. Discussed normal NB behavior. Educated patient about supply/demand and the importance of frequent stimulation. Encouraged to call LC if assistance with breastfeeding is needed.

## 2024-06-10 NOTE — LACTATION NOTE
This note was copied from a baby's chart.  LACTATION NOTE - INFANT    Evaluation Type  Evaluation Type: Inpatient    Problems & Assessment  Problems Diagnosed or Identified: Sleepy  Infant Assessment: Hunger cues present  Muscle tone: Appropriate for GA    Feeding Assessment  Summary Current Feeding: Breastfeeding exclusively  Breastfeeding Assessment: Assisted with breastfeeding w/mother's permission;Calm and ready to breastfeed;Pulling on nipple  Breastfeeding Positions: laid back;left breast  Latch: Repeated attempts, hold nipple in mouth, stimulate to suck  Audible Sucks/Swallows: A few with stimulation  Type of Nipple: Everted (after stimulation)  Comfort (Breast/Nipple): Filling, red/small blisters/bruises, mild/mod discomfort  Hold (Positioning): Full assist, teach one side, mother does other, staff holds  LATCH Score: 6

## 2024-06-10 NOTE — PROGRESS NOTES
PATIENT RECEIVED INTO ROOM 352,  CONDITION STABLE,  PERSONAL POSSESSIONS, FOB AND  BEDSIDE.  REPORT RECEIVED FROM STONEY MORALES, LABOR.    PLAN OF CARE REVIEWED

## 2024-06-10 NOTE — PROGRESS NOTES
Patient up to bathroom with assist x 2.  Voided 600 Patient transferred to mother/baby room 352 per wheelchair in stable condition with baby and personal belongings.  Accompanied by significant other and staff.  Report given to Edel mother/baby CARMEN.

## 2024-06-10 NOTE — L&D DELIVERY NOTE
[facility]  part of [facility]    Vaginal Delivery Note    Apolonia Haley Patient Status:  Outpatient    1990 MRN [id number]   Location [facility] Attending Lorie Crawford MD   Hosp Day # 0 PCP Christin Meza MD     Delivery     Infant  Date of Delivery: 6/10/2024    Time of Delivery: 2:32 AM   Delivery Type: Normal spontaneous vaginal delivery     Infant Sex  Information for the patient's :  Jesus Haley [[id number]]   male     Infant Birthweight  Information for the patient's :  Jesus Haley [[id number]]   No birth weight on file.      Presentation Vertex [1]   Position   Occiput [1]  Anterior [1]     Apgars:  1 minute: 8                5 minutes: 9                         10 minutes:      Placenta  Date/Time of Delivery: 6/10/2024  2:37 AM    Delivery: spontaneous   Induction or Augmentation: none  Placenta to Pathology: no  Cord Gases Submitted: no  Cord Blood Collection: yes  Cord Tissue Collection: yes  Cord Complications: none  Sponge and Needle Counts:  Verified yes      Maternal Anesthesia: epidural and local   Episiotomy/Laceration Repair  Laceration: perineal 2nd degree    Delivery Complications  none    Neonatologist Present: no   Status: Stable   Maternal Status: Stable   Delivery Comment:  with 2nd degree lac repaired with 2-0 vicryl with excellent hemostasis; mild SOLITARIO atony, 1000mcg cytotec given MO    Intake/Output   QBL:  See nursing documentation below        Lorie Crawford MD, MD  6/10/2024  2:56 AM      Jesus Haley [[id number]]      Labor Events     labor?: No   steroids?: None  Rupture date/time: 2024     Rupture type: AROM  Fluid color: Clear, Pink  Labor type: Spontaneous Onset of Labor  Augmentation: AROM  Indications for augmentation: Ineffective Contraction Pattern  Intrapartum & labor complications: Variable decelerations       Labor Event Times    Labor onset date/time: 2024  1000  Dilation complete date/time: 6/10/2024 0039  Start pushing date/time: 6/10/2024 0044       [place] Presentation    Presentation: Vertex  Position: Occiput Anterior       Operative Delivery    Operative Vaginal Delivery: No                Shoulder Dystocia    Shoulder Dystocia: No       Anesthesia    Method: Epidural   Analgesics:  Analgesics   FENTANYL AND BUPIVACAINE EPIDURAL             [place] Delivery      Head delivery date/time: 6/10/2024 02:32:31   Delivery date/time:  6/10/24 02:32:43   Delivery type: Normal spontaneous vaginal delivery    Details:     Delivery location: delivery room  Delivery Room Temperature: 72       Delivery Providers    Delivering Clinician: Lorie Crawford MD   Delivery personnel:  Provider Role   Talisha Erazo RN Baby Nurse   Oksana Velázquez RN Delivery Nurse             Cord    Vessels: 3 Vessels  Complications: None  Timed cord clamping: Yes  Time in sec: 30  Cord blood disposition: to lab  Gases sent?: No       Resuscitation    Method: None       [place] Measurements    No data filed       Placenta    Date/time: 6/10/2024 0237  Removal: Spontaneous  Appearance: Intact  Disposition: Lab       Apgars    Living status: Living   Apgar Scoring Key:    0 1 2    Skin color Blue or pale Acrocyanotic Completely pink    Heart rate Absent <100 bpm >100 bpm    Reflex irritability No response Grimace Cry or active withdrawal    Muscle tone Limp Some flexion Active motion    Respiratory effort Absent Weak cry; hypoventilation Good, crying              1 Minute:  5 Minute:  10 Minute:  15 Minute:  20 Minute:      Skin color: 0  1       Heart rate: 2  2       Reflex irritablity: 2  2       Muscle tone: 2  2       Respiratory effort: 2  2       Total: 8  9          Apgars assigned by: YANG MORALES   disposition: with mother       Skin to Skin    Skin to skin initiated date/time: 6/10/2024 0246  Skin to skin with: Mother       Vaginal Count    Initial count RN:  Janina Mcgregor, CARMEN Lombardo    Initial counts        Final counts               Lacerations    Episiotomy: None  Perineal lacerations: 2nd Repaired?: Yes     Vaginal laceration?: No      Cervical laceration?: No    Clitoral laceration?: No

## 2024-06-10 NOTE — H&P
[facility]  part of [facility]    History and Physical Examination      Subjective   Chief Complaint:  labor    HISTORY OF PRESENT ILLNESS:        Patient is a 34 year old y/o   @ 40w5d weeks presents for labor. She notes + fetal movement, - vaginal bleeding, and  - ROM. Her prenatal course was uncomplicated. She was scheduled for IOL on 6/10. Her prenatal care is up to date and reviewed. Patient's GBS is Negative    Lab Results   Component Value Date    GBS Negative 2024           Past Medical History:    Esophageal reflux    Thyroid disease       Past Surgical History:   Procedure Laterality Date    [place] teeth removed         OB History    Para Term  AB Living   2 0 0 0 1 0   SAB IAB Ectopic Multiple Live Births   1 0 0 0 0         No current outpatient medications on file.    Allergies   Allergen Reactions    Dextromethorphan-Guaifenesin DIZZINESS       Social History     Socioeconomic History    Marital status:      Spouse name: Not on file    Number of children: Not on file    Years of education: Not on file    Highest education level: Not on file   Occupational History    Not on file   Tobacco Use    Smoking status: Never    Smokeless tobacco: Never   Vaping Use    Vaping status: Never Used   Substance and Sexual Activity    Alcohol use: Not Currently     Comment: socially    Drug use: Never    Sexual activity: Not on file   Other Topics Concern    Not on file   Social History Narrative    engaged, no kids, works in finance     Social Determinants of Health     Financial Resource Strain: Low Risk  (2024)    Financial Resource Strain     Difficulty of Paying Living Expenses: Not very hard     Med Affordability: No   Food Insecurity: No Food Insecurity (2024)    Food Insecurity     Food Insecurity: Never true   Transportation Needs: No Transportation Needs (2024)    Transportation Needs     Lack of Transportation: No     Car Seat: Yes    Stress: No Stress Concern Present (6/9/2024)    Stress     Feeling of Stress : No   Housing Stability: Low Risk  (6/9/2024)    Housing Stability     Housing Instability: No     Housing Instability Emergency: Not on file     Crib or [place]in[place]: Yes         Family History   Problem Relation Age of Onset    Hypertension Father     Lipids Father     High Cholesterol Father     Hypertension Mother     High Cholesterol Mother     Other (Gilbert's disease) Mother     Other (Alzheimer's) Maternal Grandmother     Cancer Maternal Grandfather     Diabetes Paternal Grandmother     Other (Gilbert's disease) Brother            Prior to Admission medications    Medication Sig Start Date End Date Taking? Authorizing Provider   LEVOTHYROXINE 25 MCG Oral Tab TAKE 1 TABLET BY MOUTH BEFORE BREAKFAST. 4/25/24  Yes Chintan Cantu MD   Ferrous Sulfate 325 (65 Fe) MG Oral Tab Take 1 tablet (325 mg total) by mouth every other day. 11/23/23  Yes Jose Elias Quintana MD   escitalopram 10 MG Oral Tab Take 1 tablet (10 mg total) by mouth daily.   Yes External/Patient, Reported   prenatal vitamin with DHA 27-0.8-228 MG Oral Cap Take 1 capsule by mouth daily.   Yes External/Patient, Reported           Objective       ROS   Constitutional: Negative.  Negative for chills and fever.   Respiratory: Negative.  Negative for shortness of breath.    Cardiovascular: Negative for chest pain and palpitations.   Gastrointestinal: Negative for diarrhea, nausea and vomiting.   Genitourinary: Negative.  Negative for dysuria.   Neurological: Negative for dizziness.       Vitals:    Pulse: 85  BP: 127/73     Physical Exam   Constitutional: She appears well-developed and well-nourished.   Cardiovascular: Normal rate.   Pulmonary/Chest: Effort normal.   Abdominal: Soft.   Genitourinary: Uterus normal.   Neurological: She is alert.   Skin: Skin is warm.   Psychiatric: She has a normal mood and affect.       CE:  5/100/-1/soft     .    EFM:   Baseline 140, +  Accels, - Decels, mod Variability    [place]:  CTX every 5 min,        Lab Results   Component Value Date    ABO BLOOD TYPE B 06/09/2024    RH BLOOD TYPE Positive 06/09/2024    HGB 15.6 06/09/2024    .0 06/09/2024    HCT 45.5 06/09/2024    Rubella IgG Positive 11/22/2023    Treponemal Antibodies Nonreactive 03/14/2024    HIV Antigen Antibody Combo Non-Reactive 03/14/2024            ASSESSMENT AND PLAN:      Active Problems:    Pregnancy (HCC)        Patient admitted for Labor.  Pain medication as desired  Good FM noted, fetal monitoring strip reviewed and reassuring.      Lorie Crawford MD

## 2024-06-10 NOTE — ANESTHESIA POSTPROCEDURE EVALUATION
Patient: Apolonia Haley    Procedure Summary       Date: 06/09/24 Room / Location:     Anesthesia Start: 1734 Anesthesia Stop: 06/10/24 0237    Procedure: LABOR ANALGESIA Diagnosis:     Scheduled Providers:  Anesthesiologist: Jayy Kothari MD    Anesthesia Type: epidural ASA Status: 2 - Emergent            Anesthesia Type: epidural    Vitals Value Taken Time   /75 06/10/24 0331   Temp  06/10/24 0555   Pulse 116 06/10/24 0331   Resp 16 06/10/24 0555   SpO2 98 % 06/10/24 0225   Vitals shown include unfiled device data.    [facility] AN Post Evaluation:   Patient Evaluated in PACU  Patient Participation: complete - patient participated  Level of Consciousness: awake and alert  Pain Score: 0  Pain Management: adequate  Airway Patency:patent  Yes    Nausea/Vomiting: none  Cardiovascular Status: acceptable  Respiratory Status: acceptable  Postoperative Hydration acceptable      JAYY KOTHARI MD  6/10/2024 5:55 AM

## 2024-06-10 NOTE — PROGRESS NOTES
----- Message from Kathi Miller MD sent at 1/31/2019 10:47 AM CST -----  Please let family know (best phone number [phone], grandmother who has custody), that patient's stool test for blood is normal/negative at this time and he has no signs of blood loss/anemia on blood testing. We will call once remaining labs and stool studies return. They may call if questions!  -MM   Pt is a 34 year old female admitted to LDR3/LDR3-A.     Chief Complaint   Patient presents with    R/o Labor     Patient reports contractions since 630 that are now 3-5 minutes apart      Pt is  40w5d intra-uterine pregnancy.  History obtained, consents signed. Oriented to room, staff, and plan of care.

## 2024-06-10 NOTE — PLAN OF CARE
Dr Camejo at bedside, pt c/o of rectum pain.  RN noticed small opening near right side of rectum.   MD at bedside, orders for vaseline on rectum , ice and egg crate for sitting.  Discussed plan of care with patient.

## 2024-06-11 LAB
BASOPHILS # BLD AUTO: 0.05 X10(3) UL (ref 0–0.2)
BASOPHILS NFR BLD AUTO: 0.4 %
DEPRECATED RDW RBC AUTO: 43.8 FL (ref 35.1–46.3)
EOSINOPHIL # BLD AUTO: 0.15 X10(3) UL (ref 0–0.7)
EOSINOPHIL NFR BLD AUTO: 1.3 %
ERYTHROCYTE [DISTWIDTH] IN BLOOD BY AUTOMATED COUNT: 13.2 % (ref 11–15)
HCT VFR BLD AUTO: 36.4 %
HGB BLD-MCNC: 12.6 G/DL
IMM GRANULOCYTES # BLD AUTO: 0.05 X10(3) UL (ref 0–1)
IMM GRANULOCYTES NFR BLD: 0.4 %
LYMPHOCYTES # BLD AUTO: 2.7 X10(3) UL (ref 1–4)
LYMPHOCYTES NFR BLD AUTO: 23.1 %
MCH RBC QN AUTO: 31.1 PG (ref 26–34)
MCHC RBC AUTO-ENTMCNC: 34.6 G/DL (ref 31–37)
MCV RBC AUTO: 89.9 FL
MONOCYTES # BLD AUTO: 0.71 X10(3) UL (ref 0.1–1)
MONOCYTES NFR BLD AUTO: 6.1 %
NEUTROPHILS # BLD AUTO: 8.01 X10 (3) UL (ref 1.5–7.7)
NEUTROPHILS # BLD AUTO: 8.01 X10(3) UL (ref 1.5–7.7)
NEUTROPHILS NFR BLD AUTO: 68.7 %
PLATELET # BLD AUTO: 181 10(3)UL (ref 150–450)
RBC # BLD AUTO: 4.05 X10(6)UL
WBC # BLD AUTO: 11.7 X10(3) UL (ref 4–11)

## 2024-06-11 PROCEDURE — 85025 COMPLETE CBC W/AUTO DIFF WBC: CPT | Performed by: OBSTETRICS & GYNECOLOGY

## 2024-06-11 NOTE — LACTATION NOTE
This note was copied from a baby's chart.  LACTATION NOTE - INFANT    Evaluation Type  Evaluation Type: Inpatient    Problems & Assessment  Infant Assessment: Hunger cues present;Skin color: pink or appropriate for ethnicity  Muscle tone: Appropriate for GA    Feeding Assessment  Summary Current Feeding: Breastfeeding exclusively  Breastfeeding Assessment: Assisted with breastfeeding w/mother's permission;Calm and ready to breastfeed;Coordinated suck/swallow;Tolerated feeding well;Deep latch achieved and observed  Breastfeeding lasted # of minutes: 20  Breastfeeding Positions: football  Latch: Grasps breast, tongue down, lips flanged, rhythmic sucking  Audible Sucks/Swallows: Spontaneous and intermittent (24 hours old)  Type of Nipple: Everted (after stimulation)  Comfort (Breast/Nipple): Filling, red/small blisters/bruises, mild/mod discomfort  Hold (Positioning): Full assist, teach one side, mother does other, staff holds  LATCH Score: 8  Other (comment): Dyad seen for follow up appt at 30h PP. Infant was able to attach deeply in the football position with coordinated rythmic sucking with audible swallows. Mom endorses this is the most active she has seen baby at the breast. Discussed normal  behavior, signs of adequate weight gain, pumping guidelines and answered all moms questions.

## 2024-06-11 NOTE — LACTATION NOTE
LACTATION NOTE - MOTHER      Evaluation Type: Inpatient    Problems identified  Problems identified: Knowledge deficit    Maternal history  Maternal history: Anxiety;Hypothyroid    Breastfeeding goal  Breastfeeding goal: To maintain breast milk feeding per patient goal    Maternal Assessment  Bilateral Breasts: Soft;Symmetrical  Bilateral Nipples: Colostrum easily expressed;Slightly everted/short;Sore  Prior breastfeeding experience (comment below): Primip  Breastfeeding Assistance: Breastfeeding assistance provided with permission;Breast exam provided with permission    Pain assessment  Pain, additional: Pain w/initial sucks only  Treatment of Sore Nipples: Deeper latch techniques    Guidelines for use of:  Breast pump type: Medela Pump In Style MaxFlow  Current use of pump:: not indicated currently  Other (comment): Dyad seen for follow up appt at 30h PP. Infant was able to attach deeply in the football position with coordinated rythmic sucking with audible swallows. Mom endorses this is the most active she has seen baby at the breast. Discussed normal  behavior, signs of adequate weight gain, pumping guidelines and answered all moms questions.

## 2024-06-11 NOTE — PLAN OF CARE
Problem: Patient Centered Care  Goal: Patient preferences are identified and integrated in the patient's plan of care  Description: Interventions:  - What would you like us to know as we care for you?   Problem: Patient/Family Goals  Goal: Patient/Family Long Term Goal  Description: Patient's Long Term Goal:     Interventions:  -   - See additional Care Plan goals for specific interventions  Outcome: Progressing  Goal: Patient/Family Short Term Goal  Description: Patient's Short Term Goal:     Interventions:   -   Problem: PAIN - ADULT  Goal: Verbalizes/displays adequate comfort level or patient's stated pain goal  Description: INTERVENTIONS:  - Encourage pt to monitor pain and request assistance  - Assess pain using appropriate pain scale  - Administer analgesics based on type and severity of pain and evaluate response  - Implement non-pharmacological measures as appropriate and evaluate response  - Consider cultural and social influences on pain and pain management  - Manage/alleviate anxiety  - Utilize distraction and/or relaxation techniques  - Monitor for opioid side effects  - Notify MD/LIP if interventions unsuccessful or patient reports new pain  - Anticipate increased pain with activity and pre-medicate as appropriate  Outcome: Progressing     Problem: ANXIETY  Goal: Will report anxiety at manageable levels  Description: INTERVENTIONS:  - Administer medication as ordered  - Teach and rehearse alternative coping skills  - Provide emotional support with 1:1 interaction with staff  Outcome: Progressing     Problem: POSTPARTUM  Goal: Long Term Goal:Experiences normal postpartum course  Description: INTERVENTIONS:  - Assess and monitor vital signs and lab values.  - Assess fundus and lochia.  - Provide ice/sitz baths for perineum discomfort.  - Monitor healing of incision/episiotomy/laceration, and assess for signs and symptoms of infection and hematoma.  - Assess bladder function and monitor for bladder  distention.  - Provide/instruct/assist with pericare as needed.  - Provide VTE prophylaxis as needed.  - Monitor bowel function.  - Encourage ambulation and provide assistance as needed.  - Assess and monitor emotional status and provide social service/psych resources as needed.  - Utilize standard precautions and use personal protective equipment as indicated. Ensure aseptic care of all intravenous lines and invasive tubes/drains.  - Obtain immunization and exposure to communicable diseases history.  Outcome: Progressing  Goal: Optimize infant feeding at the breast  Description: INTERVENTIONS:  - Initiate breast feeding within first hour after birth.   - Monitor effectiveness of current breast feeding efforts.  - Assess support systems available to mother/family.  - Identify cultural beliefs/practices regarding lactation, letdown techniques, maternal food preferences.  - Assess mother's knowledge and previous experience with breast feeding.  - Provide information as needed about early infant feeding cues (e.g., rooting, lip smacking, sucking fingers/hand) versus late cue of crying.  - Discuss/demonstrate breast feeding aids (e.g., infant sling, nursing footstool/pillows, and breast pumps).  - Encourage mother/other family members to express feelings/concerns, and actively listen.  - Educate father/SO about benefits of breast feeding and how to manage common lactation challenges.  - Recommend avoidance of specific medications or substances incompatible with breast feeding.  - Assess and monitor for signs of nipple pain/trauma.  - Instruct and provide assistance with proper latch.  - Review techniques for milk expression (breast pumping) and storage of breast milk. Provide pumping equipment/supplies, instructions and assistance, as needed.  - Encourage rooming-in and breast feeding on demand.  - Encourage skin-to-skin contact.  - Provide LC support as needed.  - Assess for and manage engorgement.  - Provide breast  feeding education handouts and information on community breast feeding support.   Outcome: Progressing  Goal: Establishment of adequate milk supply with medication/procedure interruptions  Description: INTERVENTIONS:  - Review techniques for milk expression (breast pumping).   - Provide pumping equipment/supplies, instructions, and assistance until it is safe to breastfeed infant.  Outcome: Progressing  Goal: Experiences normal breast weaning course  Description: INTERVENTIONS:  - Assess for and manage engorgement.  - Instruct on breast care.  - Provide comfort measures.  Outcome: Progressing  Goal: Appropriate maternal -  bonding  Description: INTERVENTIONS:  - Assess caregiver- interactions.  - Assess caregiver's emotional status and coping mechanisms.  - Encourage caregiver to participate in  daily care.  - Assess support systems available to mother/family.  - Provide /case management support as needed.  Outcome: Progressing     - See additional Care Plan goals for specific interventions  Outcome: Progressing     - Provide timely, complete, and accurate information to patient/family  - Incorporate patient and family knowledge, values, beliefs, and cultural backgrounds into the planning and delivery of care  - Encourage patient/family to participate in care and decision-making at the level they choose  - Honor patient and family perspectives and choices  Outcome: Progressing      166

## 2024-06-11 NOTE — PROGRESS NOTES
[facility]  part of [facility]    OB/GYNE Progress Note      Apolonia Haley Patient Status:  Inpatient    1990 MRN [id number]   Location [facility] 3SE Attending Lorie Crawford MD   Hosp Day # 2 PCP Christin Meza MD          Subjective     No c/o    Review of Systems:  Constitutional: feeling well        Objective   Vital signs in last 24 hours:  Temp:  [97.8 °F (36.6 °C)-98.6 °F (37 °C)] 97.8 °F (36.6 °C)  Pulse:  [71-86] 71  Resp:  [16-18] 16  BP: (126-129)/(54-85) 127/85    Input/Output:  No intake or output data in the 24 hours ending 24 1026    Weight (Last 6):  Wt Readings from Last 6 Encounters:   24 165 lb (74.8 kg)   24 165 lb (74.8 kg)   24 164 lb (74.4 kg)   24 164 lb (74.4 kg)   24 163 lb (73.9 kg)   24 162 lb (73.5 kg)     Body mass index is 30.18 kg/m².     Exam:   Constitutional: comfortable  Uterus: fundus firm and fundus below umbilicus  Extremities: No edema  Negative Rhiannon's sign.          Results:     Lab Results   Component Value Date    TREPONEMALAB Nonreactive 2024    ABO B 2024    RH Positive 2024    WBC 11.7 (H) 2024    HGB 12.6 2024    HCT 36.4 2024    .0 2024    CREATSERUM 0.65 2021    BUN 13.0 2021     2021    K 4.23 2021     2021    CO2 24.6 2021    GLU 86 2021    CA 9.2 2021    TP 7.0 2018    AST 13 2018    ALT 14 2021    T4F 0.8 2024    TSH 2.034 2024       Lab Results   Component Value Date    COLORUR Light-Yellow 2023    CLARITY Clear 2023    SPECGRAVITY 1.015 2024    PROUR Negative 2023    GLUUR Normal 2023    KETUR Negative 2023    BILUR Negative 2023    BLOODURINE Negative 2023    NITRITE Negative 2024    UROBILINOGEN Normal 2023    LEUUR Negative 2023       No results found.      Assessment/Plan      Normal delivery at term (HCC)  pp1        cpm      Discussed with: patient     Plan discussed with patient who verbalizes understanding and agreement.          Wil Rucker MD  6/11/2024  10:26 AM

## 2024-06-12 VITALS
TEMPERATURE: 98 F | OXYGEN SATURATION: 94 % | HEART RATE: 82 BPM | DIASTOLIC BLOOD PRESSURE: 69 MMHG | HEIGHT: 62 IN | WEIGHT: 165 LBS | SYSTOLIC BLOOD PRESSURE: 111 MMHG | RESPIRATION RATE: 16 BRPM | BODY MASS INDEX: 30.36 KG/M2

## 2024-06-12 RX ORDER — PSEUDOEPHEDRINE HCL 30 MG
100 TABLET ORAL
Qty: 20 CAPSULE | Refills: 1 | Status: SHIPPED | OUTPATIENT
Start: 2024-06-12

## 2024-06-12 RX ORDER — IBUPROFEN 600 MG/1
600 TABLET ORAL EVERY 6 HOURS
Qty: 16 TABLET | Refills: 0 | Status: SHIPPED | OUTPATIENT
Start: 2024-06-12

## 2024-06-12 NOTE — PROGRESS NOTES
Discharge order received from MD.     Discharge instructions and medications reviewed with patient. ID band matched with baby band. Follow up instructions with OB given. Mother verbalizes understanding of instructions and is in stable condition. Patient discharged home with infant.

## 2024-06-12 NOTE — PROGRESS NOTES
[facility]  part of [facility]    OB/GYNE Progress Note      Apolonia Haley Patient Status:  Inpatient    1990 MRN [id number]   Location [facility] 3SE Attending Lorie Crawford MD   Hosp Day # 3 PCP Christin Meza MD          Subjective     No c/o    Review of Systems:  Constitutional: feeling well        Objective   Vital signs in last 24 hours:  Temp:  [97.9 °F (36.6 °C)-98 °F (36.7 °C)] 97.9 °F (36.6 °C)  Pulse:  [86-91] 91  Resp:  [16] 16  BP: (124-139)/(78-89) 139/89    Input/Output:  No intake or output data in the 24 hours ending 24 0825    Weight (Last 6):  Wt Readings from Last 6 Encounters:   24 165 lb (74.8 kg)   24 165 lb (74.8 kg)   24 164 lb (74.4 kg)   24 164 lb (74.4 kg)   24 163 lb (73.9 kg)   24 162 lb (73.5 kg)     Body mass index is 30.18 kg/m².     Exam:   Constitutional: comfortable  Uterus: fundus firm and fundus below umbilicus  Extremities: No edema  Negative Rhiannon's sign.          Results:     Lab Results   Component Value Date    TREPONEMALAB Nonreactive 2024    ABO B 2024    RH Positive 2024    WBC 11.7 (H) 2024    HGB 12.6 2024    HCT 36.4 2024    .0 2024    CREATSERUM 0.65 2021    BUN 13.0 2021     2021    K 4.23 2021     2021    CO2 24.6 2021    GLU 86 2021    CA 9.2 2021    TP 7.0 2018    AST 13 2018    ALT 14 2021    T4F 0.8 2024    TSH 2.034 2024       Lab Results   Component Value Date    COLORUR Light-Yellow 2023    CLARITY Clear 2023    SPECGRAVITY 1.015 2024    PROUR Negative 2023    GLUUR Normal 2023    KETUR Negative 2023    BILUR Negative 2023    BLOODURINE Negative 2023    NITRITE Negative 2024    UROBILINOGEN Normal 2023    LEUUR Negative 2023       No results found.      Assessment/Plan      Normal delivery at term (HCC)  pp2      Discussed with: patient     Plan discussed with patient who verbalizes understanding and agreement.          Wil Rucker MD  6/12/2024  8:25 AM

## 2024-06-12 NOTE — DISCHARGE SUMMARY
[facility]  part of [facility]    Discharge Summary    Apolonia Haley Patient Status:  Inpatient    1990 MRN [id number]   Location [facility] 3SE Attending Lorie Crawford MD   Hosp Day # 3 PCP Christin Meza MD     Date of Admission: 2024    Admission Diagnoses: pregnancy  Pregnancy ([facility])    Date of Discharge: 24     Discharge Diagnoses:S/P Vaginal Delivery    Episode Diagnoses:   NOB Problems (from 23 to present)       Problem Noted Resolved    Normal delivery at term ([facility]) 2024 by Lorie Crawford MD No                  Hospital Course:     EDC: Estimated Date of Delivery: 24    Gestational Age: 40w6d    Date of Delivery: 6/10/2024   Time of Delivery: 2:32 AM     Antepartum complications: none    Delivered By: LORIE CRAWFORD     Delivery Method: Normal spontaneous vaginal delivery     Delivery Procedures:     Baby: male       Apgars:  1 minute:   8                  5 minutes: 9                           10 minutes:      Feeding Method:The patient is currently breastfeeding.     Intrapartum Complications: None    Lacerations      Perineal lacerations: 2nd Repaired?: Yes     Vaginal laceration?: No      Cervical laceration?: No    Clitoral laceration?: No             Episiotomy: None     Placenta: Spontaneous     Postpartum complications: None                  Discharge Plan:   Discharge Condition: Stable    Discharge medications:  Current Discharge Medication List        New Orders    Details   docusate sodium 100 MG Oral Cap Take 100 mg by mouth daily as needed for constipation.      ibuprofen 600 MG Oral Tab Take 1 tablet (600 mg total) by mouth every 6 (six) hours.           Home Meds - Unchanged    Details   LEVOTHYROXINE 25 MCG Oral Tab TAKE 1 TABLET BY MOUTH BEFORE BREAKFAST.      Ferrous Sulfate 325 (65 Fe) MG Oral Tab Take 1 tablet (325 mg total) by mouth every other day.      escitalopram 10 MG Oral Tab Take 1 tablet (10 mg total) by  mouth daily.      prenatal vitamin with DHA 27-0.8-228 MG Oral Cap Take 1 capsule by mouth daily.                   Discharge Diet: As tolerated    Discharge Activity: Pelvic rest until cleared    Follow up:     Follow Up in the Office: 10 days for bp check      Follow-up Information       [facility]. Call.    Specialty: Pediatrics  Why: As needed  Contact information:  [address] [facility] [id number] [phone]  Additional information:  Masks are optional for all patients and visitors, unless otherwise indicated.             Lorie Crawford MD Follow up.    Specialty: OBSTETRICS & GYNECOLOGY  Contact information:  [address] [facility] [id number] [phone]                                     Wil Rucker MD  6/12/2024

## 2024-06-12 NOTE — DISCHARGE INSTRUCTIONS
What to Expect:  Abdominal cramping after delivery especially if you are breastfeeding.   Vaginal bleeding for about 4-6 weeks that may be followed by a yellow or white discharge for a few more weeks.  Your period will resume in approximately 6-8 weeks, unless you are breastfeeding.    If you are bottle feeding, you may notice breast engorgement in about 3 days.  Your breast may be sore and hard. Please wear a tight fitted bra or sports bra for 24-36 hours to help prevent your breast from producing milk, and use ice packs to relive any discomfort.  If you are breastfeeding, nipple dryness is very common the first few days.    Constipation is common after having a baby.  Please increase fluid and fiber in your diet.       Over-The-Counter Medication  Non-prescription anti-inflammatory medications can also help to ease the pain.  You may take Aleve, Tylenol or Ibuprofen   Colace or Metamucil for Constipation  Lanolin for dry nipples  Tucks, Witch Hazel and Epifoam for vaginal/perineum discomfort.   Drink a full glass of water with oral medication and take as directed.     Bathing/Showers  You may resume showers  No baths, swimming, hot tubs until your post-partum visit     Home Medication  Resume your home medications as instructed     Diet  Resume your normal diet     Activity  Refrain from vaginal intercourse, vaginal suppositories, tampon use or douches until after your post-partum visit.  No exercising for 4 weeks  You may climb stairs minimally for the 1st week.    Do not do heavy housework for at least 2-3 weeks     Return to Work or School  You may return to work in approximately 6 weeks  Contact your obstetrician’s office, if you need a medical release.      Driving  Avoid driving if you are taking narcotics for pain relief.     Follow-up Appointment with Your Obstetrician  Call your obstetrician’s office today for an appointment in 10 days  Verify your appointment date, day, time, and location.  At your 1st  post-partum office visit:  Your progress will be evaluated, findings reviewed, and any additional concerns and instructions will be discussed.     Questions or Concerns  Call your obstetrician’s office if you experience the following:  Severe pain not controlled by pain medication  Foul smelling vaginal discharge  Heavy bleeding  Shortness of breath  Fever  Redness, increased swelling or drainage from your incision  Crying and periods of sadness that prevents you from caring for yourself and your baby  Burning sensation during urination or inability to urinate  Swelling, redness or abnormal warmth to your leg/calf

## 2024-06-12 NOTE — PLAN OF CARE
02/08/2019  Ned Oliveira is a 68 y.o., male.    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     I have reviewed the Medications.     Review of Systems  Anesthesia Hx:  Denies Family Hx of Anesthesia complications.   Denies Personal Hx of Anesthesia complications.   Hematology/Oncology:  Hematology Normal   Oncology Normal     EENT/Dental:EENT/Dental Normal   Cardiovascular:   Hypertension CAD asymptomatic     Pulmonary:  Pulmonary Normal    Renal/:   Chronic Renal Disease    Hepatic/GI:  Hepatic/GI Normal    Musculoskeletal:  Musculoskeletal Normal    Neurological:   Headaches    Endocrine:   Diabetes    Dermatological:  Skin Normal    Psych:  Psychiatric Normal           Physical Exam  General:  Well nourished    Airway/Jaw/Neck:  Airway Findings: Mouth Opening: Normal Tongue: Normal  General Airway Assessment: Adult  Mallampati: I  TM Distance: Normal, at least 6 cm  Jaw/Neck Findings:  Neck ROM: Normal ROM  Neck Findings: Normal      Problem: Patient Centered Care  Goal: Patient preferences are identified and integrated in the patient's plan of care  Description: Interventions:  - What would you like us to know as we care for you?   - Provide timely, complete, and accurate information to patient/family  - Incorporate patient and family knowledge, values, beliefs, and cultural backgrounds into the planning and delivery of care  - Encourage patient/family to participate in care and decision-making at the level they choose  - Honor patient and family perspectives and choices  Outcome: Progressing     Problem: Patient/Family Goals  Goal: Patient/Family Long Term Goal  Description: Patient's Long Term Goal:     Interventions:  -   - See additional Care Plan goals for specific interventions  Outcome: Progressing  Goal: Patient/Family Short Term Goal  Description: Patient's Short Term Goal:     Interventions:   -   - See additional Care Plan goals for specific interventions  Outcome: Progressing     Problem: PAIN - ADULT  Goal: Verbalizes/displays adequate comfort level or patient's stated pain goal  Description: INTERVENTIONS:  - Encourage pt to monitor pain and request assistance  - Assess pain using appropriate pain scale  - Administer analgesics based on type and severity of pain and evaluate response  - Implement non-pharmacological measures as appropriate and evaluate response  - Consider cultural and social influences on pain and pain management  - Manage/alleviate anxiety  - Utilize distraction and/or relaxation techniques  - Monitor for opioid side effects  - Notify MD/LIP if interventions unsuccessful or patient reports new pain  - Anticipate increased pain with activity and pre-medicate as appropriate  Outcome: Progressing     Problem: ANXIETY  Goal: Will report anxiety at manageable levels  Description: INTERVENTIONS:  - Administer medication as ordered  - Teach and rehearse alternative coping skills  - Provide emotional support with 1:1    Dental:  Dental Findings: In tact   Chest/Lungs:  Chest/Lungs Clear    Heart/Vascular:  Heart Findings: Normal    Abdomen:  Abdomen Findings: Normal      Mental Status:  Mental Status Findings: Normal        Anesthesia Plan  Type of Anesthesia, risks & benefits discussed:  Anesthesia Type:  general  Patient's Preference:   Intra-op Monitoring Plan: standard ASA monitors  Intra-op Monitoring Plan Comments:   Post Op Pain Control Plan:   Post Op Pain Control Plan Comments:   Induction:   IV  Beta Blocker:  Patient is on a Beta-Blocker and has received one dose within the past 24 hours (No further documentation required).       Informed Consent: Patient understands risks and agrees with Anesthesia plan.  Questions answered. Anesthesia consent signed with patient.  ASA Score: 2     Day of Surgery Review of History & Physical:    H&P update referred to the provider.         Ready For Surgery From Anesthesia Perspective.        interaction with staff  Outcome: Progressing     Problem: POSTPARTUM  Goal: Long Term Goal:Experiences normal postpartum course  Description: INTERVENTIONS:  - Assess and monitor vital signs and lab values.  - Assess fundus and lochia.  - Provide ice/sitz baths for perineum discomfort.  - Monitor healing of incision/episiotomy/laceration, and assess for signs and symptoms of infection and hematoma.  - Assess bladder function and monitor for bladder distention.  - Provide/instruct/assist with pericare as needed.  - Provide VTE prophylaxis as needed.  - Monitor bowel function.  - Encourage ambulation and provide assistance as needed.  - Assess and monitor emotional status and provide social service/psych resources as needed.  - Utilize standard precautions and use personal protective equipment as indicated. Ensure aseptic care of all intravenous lines and invasive tubes/drains.  - Obtain immunization and exposure to communicable diseases history.  Outcome: Progressing  Goal: Optimize infant feeding at the breast  Description: INTERVENTIONS:  - Initiate breast feeding within first hour after birth.   - Monitor effectiveness of current breast feeding efforts.  - Assess support systems available to mother/family.  - Identify cultural beliefs/practices regarding lactation, letdown techniques, maternal food preferences.  - Assess mother's knowledge and previous experience with breast feeding.  - Provide information as needed about early infant feeding cues (e.g., rooting, lip smacking, sucking fingers/hand) versus late cue of crying.  - Discuss/demonstrate breast feeding aids (e.g., infant sling, nursing footstool/pillows, and breast pumps).  - Encourage mother/other family members to express feelings/concerns, and actively listen.  - Educate father/SO about benefits of breast feeding and how to manage common lactation challenges.  - Recommend avoidance of specific medications or substances incompatible with breast  feeding.  - Assess and monitor for signs of nipple pain/trauma.  - Instruct and provide assistance with proper latch.  - Review techniques for milk expression (breast pumping) and storage of breast milk. Provide pumping equipment/supplies, instructions and assistance, as needed.  - Encourage rooming-in and breast feeding on demand.  - Encourage skin-to-skin contact.  - Provide LC support as needed.  - Assess for and manage engorgement.  - Provide breast feeding education handouts and information on community breast feeding support.   Outcome: Progressing  Goal: Establishment of adequate milk supply with medication/procedure interruptions  Description: INTERVENTIONS:  - Review techniques for milk expression (breast pumping).   - Provide pumping equipment/supplies, instructions, and assistance until it is safe to breastfeed infant.  Outcome: Progressing  Goal: Experiences normal breast weaning course  Description: INTERVENTIONS:  - Assess for and manage engorgement.  - Instruct on breast care.  - Provide comfort measures.  Outcome: Progressing  Goal: Appropriate maternal -  bonding  Description: INTERVENTIONS:  - Assess caregiver- interactions.  - Assess caregiver's emotional status and coping mechanisms.  - Encourage caregiver to participate in  daily care.  - Assess support systems available to mother/family.  - Provide /case management support as needed.  Outcome: Progressing

## 2024-07-10 ENCOUNTER — POSTPARTUM (OUTPATIENT)
Dept: OBGYN CLINIC | Facility: CLINIC | Age: 34
End: 2024-07-10

## 2024-07-10 VITALS
WEIGHT: 151.63 LBS | SYSTOLIC BLOOD PRESSURE: 122 MMHG | HEIGHT: 62 IN | DIASTOLIC BLOOD PRESSURE: 78 MMHG | BODY MASS INDEX: 27.9 KG/M2

## 2024-07-10 DIAGNOSIS — Z12.4 CERVICAL CANCER SCREENING: Primary | ICD-10-CM

## 2024-07-10 NOTE — PROGRESS NOTES
HPI    Apolonia Haley is a 34 year old female  here for 4 week post-partum visit.  Patient delivered a  male infant on 6/10/24 via .  No complications    Pt states she is voiding freely, tolerating general diet, having normal bowel movements and minimal locia.  Pt has not been sexual active.  Patient undecided for contraception.  May consider nexplanon or ring or patch  Patient is breast & bottle feeding.   Pt denies any significant breast tenderness/engorgement.  Patient denies symptoms of post partum depression, [place]  depression scale score - see notes.      OBSTETRIC HISTORY  OB History    Para Term  AB Living   2 1 1   1 1   SAB IAB Ectopic Multiple Live Births   1     0 1      # Outcome Date GA Lbr Gabriel/2nd Weight Sex Type Anes PTL Lv   2 Term 06/10/24 40w6d 14:39 / 01:53 7 lb 9.3 oz (3.44 kg) M NORMAL SPONT EPI N TOMAS      Complications: Variable decelerations   1 SAB 2015     SAB   FD       GYNE HISTORY        MEDICATIONS:    Current Outpatient Medications:     LEVOTHYROXINE 25 MCG Oral Tab, TAKE 1 TABLET BY MOUTH BEFORE BREAKFAST., Disp: 90 tablet, Rfl: 1    Ferrous Sulfate 325 (65 Fe) MG Oral Tab, Take 1 tablet (325 mg total) by mouth every other day., Disp: 45 tablet, Rfl: 1    escitalopram 10 MG Oral Tab, Take 1 tablet (10 mg total) by mouth daily., Disp: , Rfl:     prenatal vitamin with DHA 27-0.8-228 MG Oral Cap, Take 1 capsule by mouth daily., Disp: , Rfl:     docusate sodium 100 MG Oral Cap, Take 100 mg by mouth daily as needed for constipation. (Patient not taking: Reported on 7/10/2024), Disp: 20 capsule, Rfl: 1    ibuprofen 600 MG Oral Tab, Take 1 tablet (600 mg total) by mouth every 6 (six) hours. (Patient not taking: Reported on 7/10/2024), Disp: 16 tablet, Rfl: 0    ALLERGIES:    Allergies   Allergen Reactions    Dextromethorphan-Guaifenesin DIZZINESS       PHYSICAL EXAM  Blood pressure 122/78, height 5' 2\" (1.575 m), weight 151 lb 9.6 oz (68.8 kg),  last menstrual period 2023, currently breastfeeding.  General:  Well nourished, well developed woman in no acute distress  Abdomen:  soft, nontender, no masses  External Genitalia: normal appearance, hair distribution, and no lesions  Vagina:  Normal appearance without lesions, no abnormal discharge  Cervix:  Normal without tenderness on motion  Uterus: normal in size, contour, position, mobility, without tenderness  Adnexa: normal without masses or tenderness  Perineum: well healed perineum  Anus: no hemorroids       ASSESSMENT/PLAN  34 year old female  here for post-partum visit  Approp recovery pp  Pap done today    Discussed return to fertility and menses.  Patient counseled on contraceptive options.  Patient will let us know. Info provided.  Patient to return 1yr or prn.

## 2024-07-11 LAB — HPV E6+E7 MRNA CVX QL NAA+PROBE: NEGATIVE

## 2024-07-31 ENCOUNTER — OFFICE VISIT (OUTPATIENT)
Dept: OBGYN CLINIC | Facility: CLINIC | Age: 34
End: 2024-07-31

## 2024-07-31 VITALS
DIASTOLIC BLOOD PRESSURE: 77 MMHG | WEIGHT: 152 LBS | BODY MASS INDEX: 28 KG/M2 | SYSTOLIC BLOOD PRESSURE: 113 MMHG | HEART RATE: 77 BPM

## 2024-07-31 DIAGNOSIS — N89.8 VAGINAL IRRITATION: Primary | ICD-10-CM

## 2024-07-31 PROCEDURE — 99213 OFFICE O/P EST LOW 20 MIN: CPT | Performed by: STUDENT IN AN ORGANIZED HEALTH CARE EDUCATION/TRAINING PROGRAM

## 2024-07-31 RX ORDER — ESTRADIOL 0.1 MG/G
0.5 CREAM VAGINAL NIGHTLY
Qty: 42.5 G | Refills: 0 | Status: SHIPPED | OUTPATIENT
Start: 2024-07-31

## 2024-07-31 NOTE — PATIENT INSTRUCTIONS
Vulvar Care     Cleaning:  -use plain warm (not hot) water (no soap) to wash if needed or can take Sitz bath (see below)  -use water, fingers, & only very gentle, fragrance-free, moisturizing cleanser      (e.g. Cetaphil or CeraVe hydrating or gentle cleansers meant for eczema/psoriasis & faces)  -only pat dry gently (no rubbing)     Sitz baths:  -soothing and cleansing  -Get a Sitz Bath from [facility] or [facility]--fits over toilet, you can fill with water to soak vulva without having to get in bathtub  -Use 1-3 times per day for ten minutes at a time  -Pat dry, apply thin layer of vaseline to protect the skin     Protection/Prevention:  -goal is to maintain an intact, moist (non-dehydrated) skin barrier  -need to prevent irritation & over-drying of skin that can lead to micro-tears, cracking, and itching, pain, & bleeding.  -do not scratch or wipe hard (mechanical injury)  -avoid strong chemicals/fragrances (fragrance free soap & detergents, avoid fabric softeners)  -avoid other irritants (e.g. sweat, leaked urine, leaked stool)  -avoid excessive friction (no thongs, always use lubricant for intercourse, daily barrier cream or ointment)  -breathable underwear, change underwear if sweaty/wet, no underwear while sleeping if possible.  -DO USE a barrier product for protection       (e.g. Aquaphor, vaseline, A&D ointment, Zinc oxide, diaper rash cream) at least once daily  -DO NOT use any instrument (including fingers) in the anus first and then in the vagina. This will cause a bacterial infection of the vagina.     Treatment:  -BEST TREATMENT IS PREVENTION  -ointments are generally more potent than creams  -use just a thin layer  -during treatment (usually at least 2 wk) it is best to avoid intercourse to avoid trauma to the skin  -if diagnosed with chronic inflammatory skin condition (e.g. lichen sclerosis)         Most important is avoiding scratching & irritants         Work with gynecologist to form a steroid  regimen for long term use         Often will need daily strong topical steroid (prescription) for at least 12 weeks.           Best to try to taper down to 2-3 times per week or weekly if able & can increase use in a flare  -if sensitive/dry skin         Add back some oils &/or moisture on a regular basis         Coconut oil is pure (no irritants) & contains ceramides (fatty acids) that are             important for maintaining skin barrier         Hyaluronic acid (there are suppositories for intravaginal use e.g Revaree)         Vaginal moisturizer products can be used topically as well (e.g. Replens, Luvena)         Still recommend a barrier product in addition (on top of the above)     If you are tempted to scratch:  -place ice pack on area for 15-20 minutes & distract yourself.  -if needed can (sparingly) place a thin layer of lidocaine ointment or cream        (e.g. Bikini-zone, etc over the counter)  -can take an oral anti-histamine (e.g. Benadryl, Claritin, Zyrtec, etc)  -can also use a topical steroid (hydrocortisone ointment over the counter) for a few days        Take care - chronic steroid use can cause skin thinning & can worsen your problem.

## 2024-07-31 NOTE — PROGRESS NOTES
[facility]  Obstetrics and Gynecology  Focused Gynecology Problem Exam      Apolonia Haley is a 34 year old female presenting for Vaginal Problem (Pt c/o vaginal irritation and burning sensation X 2/ weeks pain in the vagina)  .    HPI:     Chief Complaint   Patient presents with    Vaginal Problem     Pt c/o vaginal irritation and burning sensation X 2/ weeks pain in the vagina     Symptoms started 2wk ago when she started to  incorporate strength training. Felt discomfort and some pain when doing lateral lunges. She stopped and pain diminished. Will feel occ stinging sensation around the area when she voids. She denies any aub or abnl dc. No foul smell, fevers or chills  She is still breast feeding  Has not yet resumed intercourse.    Menarche: 14 years (11/20/2023 11:05 AM)  Period Cycle (Days): 28-29 days (11/20/2023 11:05 AM)  Period Duration (Days): 7 days (11/20/2023 11:05 AM)  Period Flow: heavy for 3 days, then light (11/20/2023 11:05 AM)  Use of Birth Control (if yes, specify type): None (11/20/2023 11:05 AM)  Hx Prior Abnormal Pap: No (11/20/2023 11:05 AM)  Pap Date: 12/20/21 (11/20/2023 11:05 AM)  Pap Result Notes: normal (11/20/2023 11:05 AM)      Medications (Active prior to today's visit):  Current Outpatient Medications   Medication Sig Dispense Refill    estradiol (ESTRACE) 0.1 MG/GM Vaginal Cream Place 0.5 g vaginally at bedtime. Apply to vaginal introitus every night at bedtime for 2 weeks and then apply twice a week for an additional 2 weeks 42.5 g 0    docusate sodium 100 MG Oral Cap Take 100 mg by mouth daily as needed for constipation. 20 capsule 1    ibuprofen 600 MG Oral Tab Take 1 tablet (600 mg total) by mouth every 6 (six) hours. 16 tablet 0    LEVOTHYROXINE 25 MCG Oral Tab TAKE 1 TABLET BY MOUTH BEFORE BREAKFAST. 90 tablet 1    Ferrous Sulfate 325 (65 Fe) MG Oral Tab Take 1 tablet (325 mg total) by mouth every other day. 45 tablet 1    escitalopram 10 MG Oral Tab Take 1 tablet (10 mg  total) by mouth daily.      prenatal vitamin with DHA 27-0.8-228 MG Oral Cap Take 1 capsule by mouth daily.       Allergies:  Allergies   Allergen Reactions    Dextromethorphan-Guaifenesin DIZZINESS     HISTORY:     OB History    Para Term  AB Living   2 1 1   1 1   SAB IAB Ectopic Multiple Live Births   1     0 1      # Outcome Date GA Lbr Gabriel/2nd Weight Sex Type Anes PTL Lv   2 Term 06/10/24 40w6d 14:39 / 01:53 7 lb 9.3 oz (3.44 kg) M NORMAL SPONT EPI N TOMAS      Complications: Variable decelerations   1 SAB 2015     SAB   FD         Past Medical History:    Esophageal reflux    Thyroid disease       Past Surgical History:   Procedure Laterality Date    Kidder teeth removed         Family History   Problem Relation Age of Onset    Hypertension Father     Lipids Father     High Cholesterol Father     Hypertension Mother     High Cholesterol Mother     Other (Gilbert's disease) Mother     Other (Alzheimer's) Maternal Grandmother     Cancer Maternal Grandfather     Diabetes Paternal Grandmother     Other (Gilbert's disease) Brother        Social History     Socioeconomic History    Marital status:      Spouse name: Not on file    Number of children: Not on file    Years of education: Not on file    Highest education level: Not on file   Occupational History    Not on file   Tobacco Use    Smoking status: Never    Smokeless tobacco: Never   Vaping Use    Vaping status: Never Used   Substance and Sexual Activity    Alcohol use: Not Currently     Comment: socially    Drug use: Never    Sexual activity: Not on file   Other Topics Concern    Not on file   Social History Narrative    engaged, no kids, works in finance     Social Determinants of Health     Financial Resource Strain: Low Risk  (2024)    Financial Resource Strain     Difficulty of Paying Living Expenses: Not very hard     Med Affordability: No   Food Insecurity: No Food Insecurity (2024)    Food Insecurity     Food  Insecurity: Never true   Transportation Needs: No Transportation Needs (6/9/2024)    Transportation Needs     Lack of Transportation: No     Car Seat: Yes   Stress: No Stress Concern Present (6/9/2024)    Stress     Feeling of Stress : No   Housing Stability: Low Risk  (6/9/2024)    Housing Stability     Housing Instability: No     Housing Instability Emergency: Not on file     Crib or Bassinette: Yes       ROS:   Review of Systems:    Constitutional:    denies fever / chills  Eyes:     denies blurred or double vision  Cardiovascular:  denies chest pain or palpitations  Respiratory:    denies shortness of breath  Gastrointestinal:  denies severe abdominal pain, frequent diarrhea or constipation, nausea / vomiting  Genitourinary:    denies dysuria, bothersome incontinence  Skin/Breast:   denies any breast pain, lumps, or discharge  Neurological:    denies frequent severe headaches  Psychiatric:   denies depression or anxiety, thoughts of harming self or others  Heme/Lymph:    denies easy bruising or bleeding  PHYSICAL EXAM:   /77   Pulse 77   Wt 152 lb (68.9 kg)   LMP 08/29/2023 (Approximate)   Breastfeeding Yes   BMI 27.80 kg/m²     GENERAL: well developed, well nourished, in no apparent distress  ABDOMEN: Soft, non distended; non tender, no masses  GYNE/: External Genitalia: Normal appearing, no lesions. Urethral meatus appear wnl, no abnormal discharge or lesions noted.          Bladder: well supported, urethra wnl, no lesions or fissures                     Vagina: normal pink mucosa, no lesions, normal clear discharge. 2nd deg perineal laceration well healed, suture no longer visible, has mild ttp just to the right of the urethra. A pinpoint defect is noted at the superior aspect of the hymenal ring, at about 11 o'clock, no bleeding.                     Uterus: anteverted, mobile, non tender, normal size                                   Adnexa: non tender, no masses, normal size     ASSESSMENT:           ICD-10-CM    1. Vaginal irritation  N89.8 estradiol (ESTRACE) 0.1 MG/GM Vaginal Cream          PLAN:   Reviewed exam findings. Does not appear to be a surgical concern that would warrant a revision. 2nd deg lac is well healed and is not of concern. Will do course of estradiol. Advised sitz baths prn.  Rtc prn.     ORDERS:   No orders of the defined types were placed in this encounter.    PRESCRIPTIONS:     Requested Prescriptions     Signed Prescriptions Disp Refills    estradiol (ESTRACE) 0.1 MG/GM Vaginal Cream 42.5 g 0     Sig: Place 0.5 g vaginally at bedtime. Apply to vaginal introitus every night at bedtime for 2 weeks and then apply twice a week for an additional 2 weeks     IMAGING/ REFERRALS:    None     Lorie Crawford MD  7/31/2024  10:07 AM

## 2024-08-28 ENCOUNTER — OFFICE VISIT (OUTPATIENT)
Dept: OBGYN CLINIC | Facility: CLINIC | Age: 34
End: 2024-08-28

## 2024-08-28 VITALS
SYSTOLIC BLOOD PRESSURE: 121 MMHG | HEART RATE: 96 BPM | WEIGHT: 152 LBS | BODY MASS INDEX: 28 KG/M2 | DIASTOLIC BLOOD PRESSURE: 70 MMHG

## 2024-08-28 DIAGNOSIS — N94.9 VAGINAL DISCOMFORT: Primary | ICD-10-CM

## 2024-08-28 PROCEDURE — 99213 OFFICE O/P EST LOW 20 MIN: CPT | Performed by: STUDENT IN AN ORGANIZED HEALTH CARE EDUCATION/TRAINING PROGRAM

## 2024-08-28 NOTE — PROGRESS NOTES
[facility]  Obstetrics and Gynecology  Focused Gynecology Problem Exam      Apolonia Haley is a 34 year old female presenting for Follow - Up  .    HPI:     Chief Complaint   Patient presents with    Follow - Up     Here for f/u on vulvo vaginal discomfort. Did a course of vaginal estrace for one month. She was able to have intercourse with no pain and able to move about with no issues. She is still breast feeding.  She denies abnl bleeding or discharge    Menarche: 14 years (11/20/2023 11:05 AM)  Period Cycle (Days): 28-29 days (11/20/2023 11:05 AM)  Period Duration (Days): 7 days (11/20/2023 11:05 AM)  Period Flow: heavy for 3 days, then light (11/20/2023 11:05 AM)  Use of Birth Control (if yes, specify type): None (11/20/2023 11:05 AM)  Hx Prior Abnormal Pap: No (11/20/2023 11:05 AM)  Pap Date: 12/20/21 (11/20/2023 11:05 AM)  Pap Result Notes: normal (11/20/2023 11:05 AM)      Medications (Active prior to today's visit):  Current Outpatient Medications   Medication Sig Dispense Refill    estradiol (ESTRACE) 0.1 MG/GM Vaginal Cream Place 0.5 g vaginally at bedtime. Apply to vaginal introitus every night at bedtime for 2 weeks and then apply twice a week for an additional 2 weeks 42.5 g 0    LEVOTHYROXINE 25 MCG Oral Tab TAKE 1 TABLET BY MOUTH BEFORE BREAKFAST. 90 tablet 1    escitalopram 10 MG Oral Tab Take 1 tablet (10 mg total) by mouth daily.      docusate sodium 100 MG Oral Cap Take 100 mg by mouth daily as needed for constipation. (Patient not taking: Reported on 8/28/2024) 20 capsule 1    ibuprofen 600 MG Oral Tab Take 1 tablet (600 mg total) by mouth every 6 (six) hours. (Patient not taking: Reported on 8/28/2024) 16 tablet 0    Ferrous Sulfate 325 (65 Fe) MG Oral Tab Take 1 tablet (325 mg total) by mouth every other day. (Patient not taking: Reported on 8/28/2024) 45 tablet 1    prenatal vitamin with DHA 27-0.8-228 MG Oral Cap Take 1 capsule by mouth daily. (Patient not taking: Reported on 8/28/2024)        Allergies:  Allergies   Allergen Reactions    Dextromethorphan-Guaifenesin DIZZINESS     HISTORY:     OB History    Para Term  AB Living   2 1 1   1 1   SAB IAB Ectopic Multiple Live Births   1     0 1      # Outcome Date GA Lbr Gabriel/2nd Weight Sex Type Anes PTL Lv   2 Term 06/10/24 40w6d 14:39 / 01:53 7 lb 9.3 oz (3.44 kg) M NORMAL SPONT EPI N TOMAS      Complications: Variable decelerations   1 SAB 2015     SAB   FD         Past Medical History:    Esophageal reflux    Thyroid disease       Past Surgical History:   Procedure Laterality Date    [place] teeth removed         Family History   Problem Relation Age of Onset    Hypertension Father     Lipids Father     High Cholesterol Father     Hypertension Mother     High Cholesterol Mother     Other (Gilbert's disease) Mother     Other (Alzheimer's) Maternal Grandmother     Cancer Maternal Grandfather     Diabetes Paternal Grandmother     Other (Gilbert's disease) Brother        Social History     Socioeconomic History    Marital status:      Spouse name: Not on file    Number of children: Not on file    Years of education: Not on file    Highest education level: Not on file   Occupational History    Not on file   Tobacco Use    Smoking status: Never    Smokeless tobacco: Never   Vaping Use    Vaping status: Never Used   Substance and Sexual Activity    Alcohol use: Not Currently     Comment: socially    Drug use: Never    Sexual activity: Not on file   Other Topics Concern    Not on file   Social History Narrative    engaged, no kids, works in finance     Social Determinants of Health     Financial Resource Strain: Low Risk  (2024)    Financial Resource Strain     Difficulty of Paying Living Expenses: Not very hard     Med Affordability: No   Food Insecurity: No Food Insecurity (2024)    Food Insecurity     Food Insecurity: Never true   Transportation Needs: No Transportation Needs (2024)    Transportation Needs     Lack of  Transportation: No     Car Seat: Yes   Stress: No Stress Concern Present (6/9/2024)    Stress     Feeling of Stress : No   Housing Stability: Low Risk  (6/9/2024)    Housing Stability     Housing Instability: No     Housing Instability Emergency: Not on file     Crib or Bassinette: Yes       ROS:   Review of Systems:    Constitutional:    denies fever / chills  Eyes:     denies blurred or double vision  Cardiovascular:  denies chest pain or palpitations  Respiratory:    denies shortness of breath  Gastrointestinal:  denies severe abdominal pain, frequent diarrhea or constipation, nausea / vomiting  Genitourinary:    denies dysuria, bothersome incontinence  Skin/Breast:   denies any breast pain, lumps, or discharge  Neurological:    denies frequent severe headaches  Psychiatric:   denies depression or anxiety, thoughts of harming self or others  Heme/Lymph:    denies easy bruising or bleeding  PHYSICAL EXAM:   /70   Pulse 96   Wt 152 lb (68.9 kg)   LMP 08/29/2023 (Approximate)   Breastfeeding Yes   BMI 27.80 kg/m²     GENERAL: well developed, well nourished, in no apparent distress  ABDOMEN: Soft, non distended; non tender, no masses  GYNE/: External Genitalia: Normal appearing, no lesions. Urethral meatus appear wnl, no abnormal discharge or lesions noted.                       Bladder: well supported, urethra wnl, no lesions or fissures                     Vagina: normal pink mucosa, no lesions, normal clear discharge. 2nd deg perineal laceration well healed, suture no longer visible, has mild ttp just to the right of the urethra. A pinpoint defect is noted at the superior aspect of the hymenal ring, at about 11 o'clock, no bleeding     ASSESSMENT:    Reviewed exam findings, essentially unremarkable. She had not other pain or discomfort with intercourse or daily activities. Recommend expectant management. Discussed may continue estrace weekly prn. Reviewed vulvar care and hygiene. She may also use  topical emollients prn for irritation or discomfort. Reviewed childbirth can create trauma and delayed healing is possible. Entertained other potential causes such as vulvodynia and treatment options. Her pain is not significant at this time and she desires expectant management. Reassurance provided.      ICD-10-CM    1. Vaginal discomfort  N94.9           PLAN:   Rtc for annual or prn     ORDERS:   No orders of the defined types were placed in this encounter.    PRESCRIPTIONS:     Requested Prescriptions      No prescriptions requested or ordered in this encounter     IMAGING/ REFERRALS:    None     Lorie Crawford MD  8/28/2024  10:13 AM

## 2024-09-03 NOTE — TELEPHONE ENCOUNTER
[phone] (home)   Results called to pt  Will repeat in 48 hrs.     Please call ultrasound and cancel hold and call u/s
Justine Rm from the lab called with STAT hcg and progesterone:    HC.0  Progesterone: 2.28    Routing to provider for her review.
No order or appointment for hold and call ultrasound noted. Order placed for progesterone and hcg level.
fall: Resulted in skin tears/lacerations right lower extremity: Discharged with Keflex: Intact fluctuant hematoma drained on R LE with serosanguineous fluid return: Old Steri-Strips removed: 12 intact sutures: New Steri-Strips applied: Xeroform gauze to all wounds: No S/SX of infection    09/03/2024: [facility]: Reflects   \" 79-year-old female with a history of diabetes who takes metformin and presents with \"full body shaking\". She states that started around sometime this a.m. She reports that it has been in her upper extremities and her upper chest. History of similar in the past where she arrived to the emergency department and workup was negative and she received Ativan and all of her symptoms completely resolved. She has noted to be tachycardic in the 130s. It improved to 110 when she is resting. She did report a fall earlier this month and was seen here in this emergency department with no acute traumatic injuries. She ambulated without difficulty to the bed. She denies any chest pain or shortness of breath or abdominal pain.\"  WBC 12.3: Glucose 248:  \"79-year-old female who arrives with tremors here in the emergency department. After IV fluids and IV Ativan all of her tremors have completely resolved and she has no complaints and states she feels much better. She has no focal neurologic deficits and ambulates without difficulty. I have discussed with the patient that it is very important to follow-up closely with her primary care physician for further testing and further workup and she is comfortable with that plan of care. I discussed to return for worsening condition. All blood work was personally reviewed. She did have an initial tachycardia but that has resolved at this time. \"  Clinical impression: Tremors of nervous system    09/03/2024: Discussed injections but with her ED visit today and glucose 248, I do not feel comfortable with injections    Assessment:    Bilateral shoulder

## 2024-10-26 RX ORDER — LEVOTHYROXINE SODIUM 25 UG/1
25 TABLET ORAL
Qty: 90 TABLET | Refills: 1 | Status: SHIPPED | OUTPATIENT
Start: 2024-10-26

## 2024-12-28 NOTE — DISCHARGE SUMMARY
[facility]  History & Physical      Reason for admission: AMS and shock     HISTORY OF PRESENT ILLNESS     Ms Bangura, 84-year-old female with a medical history of paroxysmal atrial fibrillation (AF) on apixaban, complete heart block with a biventricular pacemaker (since 2021), hyperlipidemia, hypertension, HFrEF (EF64%), type 2 diabetes mellitus, stage I breast cancer, and dementia ANO x1 at baseline presented with AMS and hypotension. Patient was brought from nursing home after she was found to be altered.     In ED patient HDS but subsequently became hypotensive.      [facility] PROGRESS     Day 0 (12/23/2024):  - A line and central line placed  - levo cont, started on vaso     Day 1 (12/24/2024):  - cont on levo and vaso  - mental status change, pt does not withdraw to pain or open eyes  - stat CTH and CTA head ordered     Day 2 (12/25/2024):  - OVN required 4 pressors> levo 24 + vaso in AM  - CTH and CTA head and neck negative for bleed or high grade stenosis/occlusion  - BG >300 OVN got 8 lantus and 10 lispro, overall got 25 lispro in 24 hrs  - Patient moves to pain in all 4 extremities  - Tube feeds held for today  - Per chart review cast on R leg 2/2 distal tib-fib fracture repeat XR ordered and consider ortho consult tomorrow      Day 3 (12/26/2024):  - Switched vent settings AC/PC RR 12/ Pi 5/ I-time 0.90/ FiO2 0.40/PEEP 5   - Had multiple BM and KUB improving stool burden so resuming TF and ramping down bowel regimen   - Post procedure started lovenox instead of hep gtt     Day 4 (12/27/2024):  - Discontinued Vanc, continue Acyclovir and Meropenem  - Increased Lantus from 10 to 15 units nightly and HDSSI    Day 5 (12/28/2024):  -     PAST HISTORY     Past Medical History  Past Medical History:   Diagnosis Date    JAMES (acute kidney injury) (CMD) 12/12/2023    Anemia     Anticoagulated 03/21/2023    Aortic calcification (CMD) 04/09/2023    Consider potential : ICD-10 I70.0, Aortic calcification. This is    [facility]  part of [facility]    Discharge Summary    Apolonia Haley Patient Status:  Inpatient    1990 MRN [id number]   Location [facility] 3SE Attending Lorie Crawford MD   Hosp Day # 3 PCP Christin Meza MD     Date of Admission: 2024    Admission Diagnoses: pregnancy  Pregnancy (Formerly [facility])    Date of Discharge: 24    Discharge Diagnoses:S/P Vaginal Delivery    Episode Diagnoses:   NOB Problems (from 23 to present)       Problem Noted Resolved    Normal delivery at term (Formerly [facility]) 2024 by Lorie Crawford MD No                  Hospital Course:     EDC: Estimated Date of Delivery: 24    Gestational Age: 40w6d    Date of Delivery: 6/10/2024   Time of Delivery: 2:32 AM     Antepartum complications: none    Delivered By: LORIE CRAWFORD     Delivery Method: Normal spontaneous vaginal delivery     Delivery Procedures:     Baby: male       Apgars:  1 minute:   8                  5 minutes: 9                           10 minutes:      Feeding Method:The patient is currently breastfeeding.     Intrapartum Complications: None    Lacerations      Perineal lacerations: 2nd Repaired?: Yes     Vaginal laceration?: No      Cervical laceration?: No    Clitoral laceration?: No             Episiotomy: None     Placenta: Spontaneous     Postpartum complications: None                  Discharge Plan:   Discharge Condition: Good    Discharge medications:  Current Discharge Medication List        New Orders    Details   docusate sodium 100 MG Oral Cap Take 100 mg by mouth daily as needed for constipation.      ibuprofen 600 MG Oral Tab Take 1 tablet (600 mg total) by mouth every 6 (six) hours.           Home Meds - Unchanged    Details   LEVOTHYROXINE 25 MCG Oral Tab TAKE 1 TABLET BY MOUTH BEFORE BREAKFAST.      Ferrous Sulfate 325 (65 Fe) MG Oral Tab Take 1 tablet (325 mg total) by mouth every other day.      escitalopram 10 MG Oral Tab Take 1 tablet (10 mg total) by  mouth daily.      prenatal vitamin with DHA 27-0.8-228 MG Oral Cap Take 1 capsule by mouth daily.                   Discharge Diet: As tolerated    Discharge Activity: Pelvic rest until cleared    Follow up:     Follow Up in the Office: 10 days for bp check     Follow-up Information       [facility]. Call.    Specialty: Pediatrics  Why: As needed  Contact information:  [address] [facility] [id number] [phone]  Additional information:  Masks are optional for all patients and visitors, unless otherwise indicated.             Lorie Crawford MD Follow up.    Specialty: OBSTETRICS & GYNECOLOGY  Contact information:  [address] [facility] [id number] [phone]                                     Wil Rucker MD  6/12/2024   substantiated by abnormal diagnostics: XR Chest on 07/23/2021 showed calcification of aortic arch      Atrial fibrillation  (CMD)     Atypical chest pain     Biventricular cardiac pacemaker in situ 06/13/2018    Blunt head trauma 12/05/2023    Cardiac pacemaker     Cardiomyopathy, secondary  (CMD) 07/19/2018    CHF (congestive heart failure)  (CMD)     S/P PACEMAKER    Chronic combined systolic and diastolic HF (heart failure)  (CMD) 03/08/2019    Chronic idiopathic constipation 03/15/2019    willcontinue miralax as it helped      Chronic kidney disease     Chronic kidney disease (CKD) 11/14/2012    Chronic pain of both lower extremities 10/16/2024    Closed fracture of right tibia and fibula with routine healing 10/21/2024    Complete heart block  (CMD) 08/10/2017    Debility 12/11/2023    Decreased energy 11/12/2024    Dementia (CMD) 03/20/2021    Diabetes mellitus  (CMD)     Diabetes mellitus with stage 3 chronic kidney disease  (CMD) 03/15/2019    need to keep insulin and glipizide and eat regularly and avoid further renal deterioration with good control of sugar      Diabetic peripheral neuropathy  (CMD) 07/30/2012    Dyslipidemia 03/08/2019    Encounter for anticoagulation discussion and counseling 05/24/2021    Essential (primary) hypertension     EtOH dependence  (CMD) 12/05/2023    Fall at home, sequela 12/05/2023    Frequent falls 10/11/2024    History of Clostridium difficile colitis 02/24/2022    History of falling 02/04/2020    History of left breast cancer 2016    At the time Pt declined TX.   Saw Dr. Morris 3/16/2016    History of right breast cancer 1996    26 years ago s/p R lumpectomy, Chemo/Radiation and endocrine Tx    Hyperkalemia 12/08/2023    Hypertensive heart and kidney disease with chronic kidney disease     Hypertensive kidney disease with chronic kidney disease     Hypoglycemia 05/01/2024    Hypokalemia 06/07/2019    Hypomagnesemia 06/07/2019    Hypotension due to drugs 03/06/2020     Hypovolemic shock  (CMD) 2024    Lipoma     Local recurrence of cancer of left breast  (CMD) 2006    s/p Lumpectomy, Chemo/Radiation and anti estrogen Tx.    Longstanding persistent atrial fibrillation  (CMD) 2023    Lumbar radiculopathy, chronic 03/15/2019    not interested in surgery in the past  will live with disability      Malignant neoplasm of lower-outer quadrant of right breast of female, estrogen receptor positive (CMD) 03/15/2019    had discussion about curative Rx  she still does not want surgery  she might change her mind one day      Metabolic encephalopathy 2024    Nonrheumatic tricuspid (valve) insufficiency 2020    Other osteoporosis without current pathological fracture 2018    Pacemaker lead malfunction 2021    PAF (paroxysmal atrial fibrillation)  (CMD) 2019    Paroxysmal A-fib  (CMD)     Polypharmacy 10/16/2024    Presence of cardiac pacemaker     Repeated falls     Right-sided heart failure  (CMD) 02/10/2020    Septic shock due to undetermined organism  (CMD) 2024    Slow transit constipation 2023    Stage 3b chronic kidney disease  (CMD) 2020    Swelling of left hand 2024    Trauma 2024    Type 2 diabetes mellitus with hyperglycemia, with long-term current use of insulin (CMD) 2021        Surgical History  Past Surgical History:   Procedure Laterality Date    Breast lumpectomy Right     24 years ago    Pacemaker          Social History  Social History     Tobacco Use    Smoking status: Former     Current packs/day: 0.00     Types: Cigarettes     Quit date: 1980     Years since quittin.0    Smokeless tobacco: Never   Vaping Use    Vaping status: never used   Substance Use Topics    Alcohol use: Not Currently    Drug use: Never       Family History    Family History   Problem Relation Age of Onset    Cancer, Breast Sister         UNKNOWN AGE OF dX    Cancer, Breast Sister     Cancer, Breast Sister     Cancer Other      Diabetes Other         Allergies  ALLERGIES:  Patient has no known allergies.    Home Meds  Medications Prior to Admission   Medication Sig Dispense Refill    metFORMIN (GLUCOPHAGE) 500 MG tablet Take 500 mg by mouth every morning. Indications: Type 2 Diabetes      Glucagon (Baqsimi One Pack) 3 MG/DOSE Powder Spray 1 Dose in each nostril as needed (BG < 65 and unresponsive). Place 1 spray in 1 nostril. Call 911. If no response in 15 minutes, place 1 more spray in nostril with new device.      MAGnesium-Oxide 400 (240 Mg) MG tablet Take 400 mg by mouth daily.      Glucagon 1 MG/0.2ML Solution Auto-injector Inject 1 Dose into the skin as needed (Hypoglycemia).      mirtazapine (REMERON) 7.5 MG tablet Take 7.5 mg by mouth nightly.      ascorbic acid (VITAMIN C) 500 MG tablet Take 500 mg by mouth in the morning and 500 mg in the evening.      metoPROLOL succinate (TOPROL-XL) 25 MG 24 hr tablet Take 1 tablet by mouth daily. Indications: High Blood Pressure Begin taking on November 16, 2024. 30 tablet 1    docusate sodium-sennosides (SENOKOT S) 50-8.6 MG per tablet Take 2 tablets by mouth in the morning and 2 tablets in the evening.      HYDROcodone-acetaminophen (NORCO) 5-325 MG per tablet Take 1 tablet by mouth every 8 hours as needed for Pain.      guaiFENesin 100 MG/5ML Take 200 mg by mouth every 4 hours as needed (cough).      melatonin 3 MG Take 3 mg by mouth nightly.      atorvastatin (LIPITOR) 80 MG tablet Take 80 mg by mouth daily.      loperamide (IMODIUM) 2 MG capsule Take 2 mg by mouth 4 times daily as needed for Diarrhea.      anastrozole (ARIMIDEX) 1 MG tablet Take 1 tablet by mouth daily. 90 tablet 0    polyethylene glycol (MIRALAX) 17 g packet Take 17 g by mouth daily. Stir and dissolve powder in any 4 to 8 ounces of beverage, then drink.      lactulose (CHRONULAC) 10 GM/15ML solution Take 15 mLs by mouth daily as needed (constipation).      apixaBAN (ELIQUIS) 5 MG Tab Take 1 tablet by mouth 2 times  daily. Do not start before July 28, 2021. 60 tablet     aspirin (ECOTRIN) 81 MG EC tablet Take 81 mg by mouth daily.      acetaminophen (TYLENOL) 500 MG tablet Take 500 mg by mouth every 6 hours as needed for Pain.      omeprazole (PRILOSEC) 20 MG capsule Take 20 mg by mouth daily.      SOFTCLIX LANCETS Misc TEST BLOOD SUGAR THREE TIMES A  each 0    NOVOFINE 32G X 6 MM Misc USE TWICE A  each 0         OBJECTIVE      VITAL SIGNS   Vital Last Value 24 Hour Range   Temperature 97.9 °F (36.6 °C) (12/28/24 0400) Temp  Min: 97.5 °F (36.4 °C)  Max: 98.2 °F (36.8 °C)   Pulse 80 (12/28/24 0700) Pulse  Min: 80  Max: 104   Respiratory (!) 24 (12/28/24 0700) Resp  Min: 12  Max: 27   Non-Invasive  Blood Pressure 113/60 (12/26/24 1227) No data recorded   Pulse Oximetry 100 % (12/28/24 0700) SpO2  Min: 99 %  Max: 100 %     INTAKE/OUTPUT    Intake/Output Summary (Last 24 hours) at 12/28/2024 0812  Last data filed at 12/28/2024 0700  Gross per 24 hour   Intake 2165.53 ml   Output 315 ml   Net 1850.53 ml       PHYSICAL EXAM  Gen: Intubated, sedated  Pulm: ETT in place, on vent, saturating well  CV: Regular rate and rhythm on telemetry  Abd: Non-distended  Extrem: warm and well perfused, Right LE in cast  Skin: warm and dry without evidence of rash on exposed skin    LABS  Recent Labs     12/26/24  0525 12/27/24  0401 12/28/24  0325   WBC 19.9* 14.9* 15.6*   HGB 8.8* 8.1* 7.9*   HCT 24.6* 22.6* 22.2*    322 341   SODIUM 137 139 137   CHLORIDE 107 109 106   POTASSIUM 3.3* 3.9 3.5   CO2 19* 20* 22   BUN 28* 31* 32*   CREATININE 1.36* 1.37* 1.39*   GLUCOSE 181* 199* 244*   CALCIUM 7.6* 7.6* 7.7*   * 236* 343*   * 107* 101*   ALKPT 346* 290* 406*   BILIRUBIN 1.6* 1.1* 1.0   INR 1.4  --   --          XR ABDOMEN 1 VIEW   Final Result   FINDINGS/IMPRESSION:      Tip of Dobbhoff in lateral fundus of stomach.       Electronically Signed by: MAZIN CARLSON M.D.    Signed on: 12/27/2024 8:41 AM     Workstation ID: [id number]      CT HEAD WO CONTRAST   Final Result   No CT evidence of acute intracranial process.   Several small remote infarctions superior and inferior convexity right   cerebellar hemisphere.   Stable mild cerebral atrophy.         Electronically Signed by: ROSIBEL DU M.D.    Signed on: 12/26/2024 11:51 AM    Workstation ID: [id number]      XR ABDOMEN 1 VIEW   Final Result   FINDINGS AND IMPRESSION:      Redemonstrated gaseous distention of the rectosigmoid colon measuring up to   8.2 cm in diameter. Contrast material in decompressed small bowel loops. No   free air. Colonic diverticulosis. Feeding tube in the stomach. Contrast   material in the urinary bladder. Correlation with recent CT abdomen   findings and continued follow-up recommended.            Electronically Signed by: CHERYL ESTRADA M.D.    Signed on: 12/26/2024 9:09 AM    Workstation ID: [id number]      XR CHEST AP OR PA   Final Result   FINDINGS AND IMPRESSION:      Endotracheal and feeding tubes in place. Left-sided pacemaker leads   unchanged. Heart size stable. Minor atelectasis left lung base. No pleural   effusion or pneumothorax. Tip of the esophageal probe in the lower neck.   Correlate clinically.            Electronically Signed by: CHERYL ESTRADA M.D.    Signed on: 12/26/2024 8:07 AM    Workstation ID: [id number]      XR ANKLE MIN 3 VIEWS RIGHT   Final Result   FINDINGS AND IMPRESSION:      Fiberglas cast. Redemonstrated fracture through the distal tibia with   interval progressive callus formation and sclerosis. Alignment maintained.   Redemonstrated oblique fracture of the distal fibula with fracture plane   redemonstrated. Minimal callus formation. No change in alignment.               Electronically Signed by: CHERYL ESTRADA M.D.    Signed on: 12/25/2024 1:18 PM    Workstation ID: [id number]      US VASC UPPER EXTREMITY ARTERIES DUPLEX LEFT   Final Result      Left upper arterial vasculature appears  patent with normal waveforms.      Electronically Signed by: TRISH VILLEDA M.D.    Signed on: 12/24/2024 4:19 PM    Workstation ID: [id number]      CT HEAD WO CONTRAST   Final Result   No evidence for acute intracranial abnormality. Chronic   findings as above.      Noncontrast enhanced CT is a screening survey. Conditions such as vascular   malformations, aneurysms, low grade, tumors, meningitis, subtle   subarachnoid hemorrhages, etc., may not be demonstrated. Followup studies   as clinically indicated may be necessary.      Electronically Signed by: MARQUISE HERNÁNDEZ M.D.    Signed on: 12/24/2024 2:48 PM    Workstation ID: [id number]      CTA HEAD AND NECK   Final Result   1.   No evidence for high-grade stenosis or occlusion within the cervical   arteries.   2.   No evidence for high-grade stenosis, occlusion, or aneurysm within the   intracranial arteries.      Electronically Signed by: MARQUISE HERNÁNDEZ M.D.    Signed on: 12/24/2024 3:06 PM    Workstation ID: [id number]      CT CHEST ABDOMEN PELVIS WO CONTRAST   Final Result   Impression:         1. New left lower lobe consolidation concerning for pneumonia.   2. Findings suggesting worsening volume overload with increased small   bilateral pleural effusions and worsened soft tissue anasarca compared to   11/30/2024.   3. Similar findings of large rectal stool burden with rectal and sigmoid   distention suggesting constipation and fecal impaction with associated wall   thickening of the rectum and distal sigmoid which could be concerning for   stercoral colitis.   4. Similar mild wall thickening of the cecum and ascending colon suggesting   nonspecific colitis.   5. Similar cystic lesions and main pancreatic ductal dilatation present   dating back to 2020, again may represent mixed type IPMNs versus sequelae   of obstruction by a soft tissue lesion as previously described. Consider   nonemergent follow-up.    6. Bladder wall mass highly suspicious for  neoplasm, urology follow-up for   cystoscopy is again recommended when clinically appropriate.   7. Additional findings as described.      Electronically Signed by: SEA THAO M.D.    Signed on: 12/24/2024 5:58 PM    Workstation ID: [id number]      XR CHEST AP OR PA   Final Result   FINDINGS AND IMPRESSION:      Endotracheal tube below the lower margin of the clavicle heads. Feeding   tube traverses the mediastinum into the abdomen. Distal tip not visualized.   Left-sided pacemaker and leads in place. Heart size stable. No infiltrate,   pleural effusion or pneumothorax.            Electronically Signed by: CHERYL ESTRADA M.D.    Signed on: 12/24/2024 5:53 AM    Workstation ID: [id number]      XR ABDOMEN 1 VIEW   Final Result   FINDINGS AND IMPRESSION: Dilated loop of bowel with gas and some stool   present within the right mid to lower abdomen. This is felt to represent a   sigmoid or colonic loop. Some scattered gas-filled loops of bowel seen   throughout the left mid to lower abdomen. The entire abdomen is not   included in evaluation. NG tube is present just beyond the GE junction   projecting over the left upper quadrant. Clinical correlation for   appropriate positioning is advised.      Electronically Signed by: GENET WALTER D.O.    Signed on: 12/23/2024 4:23 PM    Workstation ID: [id number]      US LIVER GALLBLADDER PANCREAS (RUQ)   Final Result      Liver is normal in size, morphology and echotexture. No intrahepatic   biliary dilatation. Main portal vein, hepatic veins and intrahepatic IVC   are patent with color flow.      The gallbladder shows normal size and shape without thickening of the   gallbladder wall or pericholecystic fluid.  There is no evidence of   cholelithiasis. There is no sludge. The common bile duct has a normal   caliber of 0.6 cm.       Majority of pancreas not seen due to overlying bowel gas.      The right kidney shows no hydronephrosis.             Electronically Signed  by: TRISH VILLEDA M.D.    Signed on: 12/23/2024 3:55 PM    Workstation ID: [id number]      XR CHEST AP OR PA   Final Result   FINDINGS/IMPRESSION:      New right jugular line with tip in the area superior cavoatrial junction.      Few prominent interstitial markings at lung bases improved. Minimal   blunting left costophrenic angle. No pneumothorax.       Electronically Signed by: MAZIN CARLSON M.D.    Signed on: 12/23/2024 10:14 AM    Workstation ID: [id number]      XR CHEST PA OR AP 1 VIEW   Final Result   No acute intrathoracic abnormality.      Electronically Signed by: ALEX RANDALL M.D.    Signed on: 12/22/2024 7:36 PM    Workstation ID: [id number]      CT ABDOMEN PELVIS W CONTRAST - IV contrast only   Final Result   *   New cecal and ascending colonic wall thickening and worsening   rectosigmoid wall thickening with mild adjacent stranding indicating   nonspecific colitis, probably infectious/inflammatory. Moderate to large   volume stool in the rectosigmoid colon. Correlate for fecal impaction.   *   Diffuse circumferential bladder wall thickening appears out of degree   to underdistention. Correlate clinically with cystitis.   *   Redemonstrated multiple cystic lesions throughout the pancreatic body   and tail with main pancreatic duct dilation up to 1.6 cm. This is probably   related to obstruction by a soft tissue nodule at the pancreatic body/neck.   Less likely this could be mixed IPMN. This has been present as far back as   2020. Nonemergent MRI abdomen with and without contrast pancreas protocol   is again recommended for further evaluation.   *   Redemonstrated approximately 2 cm hyperdense soft tissue mass extending   to the posterior bladder suspicious for ureteral/bladder neoplasm or   possible hematoma. Direct visualization with cystoscopy is again   recommended.   *   Small left pleural effusion with adjacent compressive atelectasis.    *   Mild anasarca.   *    Diverticulosis without diverticulitis.      Dictated by: CHING ANTONIO MD   Dictated on: 12/22/2024 7:30 PM          IALEX M.D., have reviewed the images and report and concur   with these findings interpreted by CHING ANTONIO MD.      Electronically Signed by: ALEX RANDALL M.D.    Signed on: 12/22/2024 8:27 PM    Workstation ID: [id number]      CT HEAD WO CONTRAST   Final Result      *   No acute intracranial abnormality.   *   Chronic findings as above.         Dictated by: CHING ANTONIO MD   Dictated on: 12/22/2024 7:26 PM          IALEX M.D., have reviewed the images and report and concur   with these findings interpreted by CHING ANTONIO MD.      Electronically Signed by: ALEX RANDALL M.D.    Signed on: 12/22/2024 8:03 PM    Workstation ID: [id number]      IR LUMBAR PUNCTURE DIAGNOSTIC    (Results Pending)        CURRENT MEDS  Current Facility-Administered Medications   Medication    Potassium Standard Replacement Protocol (Levels 3.5 and lower)    Potassium Replacement (Levels 3.6 - 4)    Magnesium Standard Replacement Protocol    insulin glargine (LANTUS) injection 20 Units    meropenem (MERREM) 1 g in sodium chloride 0.9 % 100 mL IVPB    rifAXIMin (XIFAXAN) tablet 550 mg    chlorhexidine gluconate (PERIDEX) 0.12 % solution 15 mL    And    chlorhexidine gluconate (PERIDEX) 0.12 % solution 15 mL    petrolatum (white)-mineral oil (LUBRIFRESH PM) ophthalmic ointment 1 application.    pantoprazole (PROTONIX) 40 MG/20ML (compounded) suspension 40 mg    fentaNYL (SUBLIMAZE) 2,500 mcg/250 mL in sodium chloride 0.9 % infusion    thiamine (VITAMIN B-1) injection 200 mg    [Held by provider] bisacodyl (DULCOLAX) suppository 10 mg    enoxaparin (LOVENOX) injection 70 mg    NORepinephrine (LEVOPHED) 8 mg/250 mL in dextrose 5 % infusion    PHENYLephrine (KARLENE-SYNEPHRINE) 50 mg/250 mL sodium chloride 0.9 % infusion    etomidate (AMIDATE) injection    dextrose 50 %  injection 25 g    dextrose 50 % injection 12.5 g    glucagon (GLUCAGEN) injection 1 mg    dextrose (GLUTOSE) 40 % gel 15 g    dextrose (GLUTOSE) 40 % gel 30 g    insulin lispro (ADMELOG,HumaLOG) - Correction Dose    acyclovir (ZOVIRAX) 600 mg in dextrose 5 % 100 mL IVPB    vasopressin (VASOSTRICT) 20 unit/100 mL dextrose 5 % infusion    hydroCORTisone (Solu-CORTEF) PF injection 50 mg    [Held by provider] metoPROLOL succinate (TOPROL-XL) ER tablet 25 mg    aspirin chewable 81 mg    [Held by provider] atorvastatin (LIPITOR) tablet 80 mg    [Held by provider] mirtazapine (REMERON) tablet 7.5 mg    fludrocortisone (FLORINEF) tablet 0.05 mg    diphenhydrAMINE (BENADRYL) capsule 25 mg    sodium chloride 0.9 % injection 10 mL    sodium chloride 0.9% infusion    sodium chloride (NORMAL SALINE) 0.9 % bolus 500 mL          ASSESSMENT AND PLAN     Glenda Bangura is a 84 year old female with PMH of paroxysmal atrial fibrillation (AF) on apixaban, complete heart block with a biventricular pacemaker (since 2021), hyperlipidemia, hypertension, HFrEF (EF64%), type 2 diabetes mellitus, stage I breast cancer, and dementia ANO x1 at baseline, admitted to [facility] on 12/23/24 for AMS and shock. On MICU Day #5.     NEURO / PSYCH:  #Acute Pain  - GCS: 8T (Eyes 3-To voice; Verbal 1-Intubated; Motor 4-Withdrawal to pain)  - Gtts: Fentanyl 25 mcg/hr  - Scheduled pain meds: None  - PRN pain meds: None  - Pain meds given overnight: Fentanyl gtt at 25 mcg/hr     #Acute metabolic encephalopathy  #Dementia  #Hyperammonemia  - Likely  2/2 infection   - ANOx1 at baseline, currently comatose  - 30 min EEG consistent with severe global encephalopathy. No seizures   - CTA head and neck 12/24: Unremarkable  - Repeat CT Head 12/26: No evidence of acute intracranial process. Chronic infarcts.   - Ammonia Level 12/26: 56, 12/27: 41, 12/28: 49   - Started Rifaximin 550 mg BID on 12/27  - LP 12/26: Glucose 136, Protein 65, no organisms on gram stain  -  Rapid meningitis panel: negative  - Vanc discontinued per ID recs 12/28     Plan:  - Continue empiric tx for meningitis w/ acyclovir and meropenem (end date 12/29)  - appreciate ID Recs  - Continue treatment of hyperammonemia with rifaximin  - Consult to palliative    PULMONARY:  #AHRF s/p intubation 12/23-  #Respiratory alkalosis  #Pneumonia   - Intubated 12/23 for desats to 87 with incr WOB, vent setting as below, not overbreathing  - Vent: AC/PC Rate 12, Insp. Pressure 7, I-time 1.00, FiO2 0.30, PEEP 5.   - IBW: 57 kg  - AMS precludes extubation  - ABG this am 12/28 improving resp alkalosis: 7.43/30/139/20  - CXR (12/22) no focal consolidation or effusion  - CT chest 12/24 LLL consolidation    PLAN:  -Continue with empiric tx for meningitis w/ acyclovir and meropenem, discontinued Vanc per ID recs 12/27    CARDIOVASCULAR: (HR  and MAPs around 60 overnight)   #Septic Shock   #HFrEF  #Complete heart block s/p biv PM   #A-Fib  - On Levo at 6 mcg/min and Vaso at 0.3, wean as tolerated, MAP Goal > 60  - Pacemaker first implanted in 2017 and upgraded in 2021 to biv with new RA lead due to fractured lead   - TTE 12/23/2024: Akinesis of the basalanteroseptal,anterior, anterolateral, inferolateral, inferior, and inferoseptal walls. EF 50% Changed since 12/4 study:  New akinesis of basal segments of all walls, consistent with possible stress cardiomyopathy with atypical variant  - Cards consulted, repeat TTE after shock resolves  - Continue hydrocortisone 50 mg q6h + PO fludrocortisone 0.05 qd  - Cont PTA ASA 81 mg via NG  - Holding lipitor 80 mg qd for transaminitis  - Therapeutic Lovenox 70 mg daily   - Follow-up CVC Venous O2 to assess septic vs cardiogenic shock    RENAL / METABOLIC:  #Oliguric JAMES on CKD lllb  #Hypernatremia, resolved  - Torres not in place  - Net fluid status difficult to assess given unmeasured urine and stools in chart  - Continue to monitor UOP  - Replete lytes PRN, repleted with 40 meq  Potassium Chloride packet this am     Recent Labs   Lab 12/28/24  0325 12/27/24  0401 12/26/24  0525   SODIUM 137 139 137   POTASSIUM 3.5 3.9 3.3*   CHLORIDE 106 109 107   CO2 22 20* 19*   BUN 32* 31* 28*   CREATININE 1.39* 1.37* 1.36*   GLUCOSE 244* 199* 181*     GI:  - Tube Feeds at goal Jevity 50 ml/hr continuous, Free Water Flushes: 30 cc Q4    #Chronic constipation with stercolitis   #Pancreatic cysts  - On CT abdomen/pelvis: New cecal and ascending colonic wall thickening and worsening  rectosigmoid wall thickening with mild adjacent stranding indicating nonspecific colitis, probably infectious/inflammatory. Moderate to large volume stool in the rectosigmoid colon.   - 12/26 KUB w/ improved stool burden  - 12/27 KUB: confirmed Dobhoff placement in fundus of stomach  - 12/26 having multiple liquid BM  - 12/27: Flexiseal bowel management system placed due to large liquid stools  - 12/28: 150 cc bowel movements overnight, will remove Flexiseal today     Plan:  - Holding daily suppository and enemas  - Continue Tfs at goal Jevity 50 ml/hr continuous, Free Water Flushes: 30 cc Q4  - Continue Pantoprazole 40 mg daily      #Shock liver, resolving  - AST 2432>236>343 >107>101  - RUQ US: unremarkable  - alpha 1 wnl, antismooth negative, antimito wnl, cerruloplasm wnl  - Hep A and Hep B prior exposure not active infection  - Per GI likely ischemic hepatitis 2/2 shock  - CT scan is also notable for findings of constipation as well as stercoral colitis      PLAN:  - Continue to trend LFTs  - Holding lipitor 80 mg qd  - MRI/MRCP when patient more stable     #At risk for refeeding syndrome  - Resumed TFs post LP  - Daily folate and thiamine  - Will reduce folate dose once high dose expires     HEME / ONC:  #Acute on chronic Anemia   - Hgb trend as below  - no signs of acute bleeding  - Peripheral smear with Microcytic anemia with schistocytosis, target cells and anisocytosis  - chronic likely 2/2 to CKD  - Iron 91,  percent sat 96%, TIBC 95, ferritin 5,160  - DVT Prophylaxis: SCD's  - Lovenox 70 mg daily given A-Fib     HGB (g/dL)   Date Value   2024 7.9 (L)   2024 8.1 (L)   2024 8.8 (L)     PLT (K/mcL)   Date Value   2024 341   2024 322   2024 247     ENDOCRINE:  #T2DM  #Steroid-Induced Hyperglycemia  - Glucose 244 this am  - 10 units nightly Lantus scheduled, increased to 15 units nightly on   - On HDSSI, 18 units lispro received overnight  - : Schedule Lispro 5 units Q6  - Maintain goal < 180  - Holding PTA metformin     Recent Labs   Lab 24  1145 24  1728 24  2053 24  2326 24  0551   GLUCOSE BEDSIDE 243* 250* 220* 250* 251*      ID:  #Septic shock  #Hx of MDRO E. coli and klebsiella UTI ()  - Likely septic from UTI vs colitis  - procal on admit 16.35  -  WBC dontrending to 19  - CT a/p: Nonspecific colitis, probably infectious vs inflamatory. Fecal impaction. Diffuse circumferential bladder wall thickening appears out of degree to underdistention. Correlate clinically with cystitis.  - Chest Xray: There is no consolidation, pleural effusion, or pneumothorax. Mild bibasilar atelectasis.  - UA leuk esterase large, Leuk   - WBC trend as below, afebrile  - Blood cultures and urine cultures collected , NGTD for 5 days    Plan:  - ID on board, appreciate recs  - Continue empiric tx for meningitis w/ acyclovir and 1g BID meropenem (end date )  - Discontinued vanc  per ID Recs    Temp (24hrs), Av °F (36.7 °C), Min:97.5 °F (36.4 °C), Max:98.2 °F (36.8 °C)    WBC (K/mcL)   Date Value   2024 15.6 (H)   2024 14.9 (H)   2024 19.9 (H)     LTDA: Dobhoff in addition to below     CVC Triple Lumen 24 Right Jugular (Active)   Site Assessment WDL 24   Dressing Type CHG impregnated dressing 24   Dressing Status [facility] 24   Dressing Activity Applied 24 0900   Dressing Changed  On   12/23/24 12/27/24 0800   Interventions Labs drawn 12/27/24 0800   Port 1 Lumen Cap Applied/Changed On 12/27/24 12/27/24 0800   Port 1 Line Status Infusing 12/27/24 2000   Port 2 Lumen Cap Applied/Changed On 12/27/24 12/27/24 0800   Port 2 Line Status Infusing 12/27/24 2000   Port 3 Lumen Cap Applied/Changed On 12/27/24 12/27/24 0800   Port 3 Line Status Infusing 12/27/24 2000       Midline Peripheral IV Single Lumen 12/22/24 Left Arm, upper (Active)   Site Assessment WDL 12/27/24 2000   Dressing Type Chlorhexidine patch;Transparent 12/27/24 2000   Dressing Assessment [facility] 12/27/24 2000   Dressing Activity Applied 12/22/24 1911   Dressing Changed On   12/22/24 12/27/24 0800   Interventions Capped IV 12/27/24 0800   Port 1 Lumen Cap Applied/Changed On 12/22/24 12/27/24 0800   Port 1 Line Status Infusing 12/27/24 2000       Arterial Line 12/23/24 Right Radial (Active)   Monitoring/Calibration Leveled;Zeroed 12/27/24 2000   Site Assessment [facility] 12/27/24 2000   Dressing Type CHG impregnated dressing 12/27/24 2000   Dressing Activity Applied 12/23/24 0900   Dressing Changed On   12/23/24 12/27/24 0800   Interventions Labs drawn 12/27/24 0800   Line Status WDL 12/27/24 2000       Plan discussed with our attending, Dr. Patel.     César Fajardo,   Anesthesiology, PGY-1  Medical ICU

## (undated) NOTE — Clinical Note
IUP at 21w2d Normal level II Ultrasound Hypothyroidism, mildly increased TSH for pregnancy SSRI use  RECOMMENDATIONS: Continue care with Dr. Cantu Growth and BPP at 32 weeks Monitor TSH now then every 4 weeks until her dose is stable, then reduce to every 8-12 weeks Monitor for exacerbation of her mood disorder

## (undated) NOTE — LETTER
VACCINE ADMINISTRATION RECORD  PARENT / GUARDIAN APPROVAL  Date: 2024  Vaccine administered to: Apolonia Haley     : 1990    MRN: [id number]    A copy of the appropriate Centers for Disease Control and Prevention Vaccine Information statement has been provided. I have read or have had explained the information about the diseases and the vaccines listed below. There was an opportunity to ask questions and any questions were answered satisfactorily. I believe that I understand the benefits and risks of the vaccine cited and ask that the vaccine(s) listed below be given to me or to the person named above (for whom I am authorized to make this request).    VACCINES ADMINISTERED:  Tdap    I have read and hereby agree to be bound by the terms of this agreement as stated above. My signature is valid until revoked by me in writing.  This document is signed by self, relationship: Self on 2024.:                                                                                                                                         Parent / Guardian Signature                                                Date    Keke HAZEL CMA served as a witness to authentication that the identity of the person signing electronically is in fact the person represented as signing.